# Patient Record
Sex: MALE | Race: WHITE | NOT HISPANIC OR LATINO | Employment: OTHER | URBAN - METROPOLITAN AREA
[De-identification: names, ages, dates, MRNs, and addresses within clinical notes are randomized per-mention and may not be internally consistent; named-entity substitution may affect disease eponyms.]

---

## 2023-09-18 LAB
PROSTATE SPECIFIC AG (NG/ML) IN SER/PLAS: 19.1 NG/ML (ref 0–4)
PROSTATE SPECIFIC AG FREE (NG/ML) IN SER/PLAS: 3.6 NG/ML
PROSTATE SPECIFIC AG FREE/PROSTATE SPECIFIC AG TOTAL IN SER/PLAS: 19 %

## 2023-10-10 DIAGNOSIS — K21.9 GASTROESOPHAGEAL REFLUX DISEASE, UNSPECIFIED WHETHER ESOPHAGITIS PRESENT: Primary | ICD-10-CM

## 2023-10-18 ENCOUNTER — LAB (OUTPATIENT)
Dept: LAB | Facility: LAB | Age: 58
End: 2023-10-18
Payer: COMMERCIAL

## 2023-10-18 DIAGNOSIS — R97.20 ELEVATED PROSTATE SPECIFIC ANTIGEN (PSA): Primary | ICD-10-CM

## 2023-10-18 PROCEDURE — 84154 ASSAY OF PSA FREE: CPT

## 2023-10-18 PROCEDURE — 36415 COLL VENOUS BLD VENIPUNCTURE: CPT

## 2023-10-18 PROCEDURE — 84153 ASSAY OF PSA TOTAL: CPT

## 2023-10-20 LAB
PSA FREE MFR SERPL: 17 %
PSA FREE SERPL-MCNC: 1.6 NG/ML
PSA SERPL IA-MCNC: 9.6 NG/ML (ref 0–4)

## 2023-10-25 RX ORDER — PANTOPRAZOLE SODIUM 40 MG/1
40 TABLET, DELAYED RELEASE ORAL
Qty: 90 TABLET | Refills: 0 | Status: SHIPPED | OUTPATIENT
Start: 2023-10-25

## 2024-03-21 ENCOUNTER — NUTRITION (OUTPATIENT)
Dept: NUTRITION | Facility: HOSPITAL | Age: 59
End: 2024-03-21
Payer: COMMERCIAL

## 2024-03-21 VITALS — BODY MASS INDEX: 30.16 KG/M2 | HEIGHT: 74 IN | WEIGHT: 235 LBS

## 2024-03-21 DIAGNOSIS — E11.69 TYPE 2 DIABETES MELLITUS WITH OTHER SPECIFIED COMPLICATION, WITHOUT LONG-TERM CURRENT USE OF INSULIN (MULTI): Primary | ICD-10-CM

## 2024-03-21 DIAGNOSIS — E11.65 TYPE 2 DIABETES MELLITUS WITH HYPERGLYCEMIA (MULTI): ICD-10-CM

## 2024-03-21 PROCEDURE — 97802 MEDICAL NUTRITION INDIV IN: CPT | Performed by: FAMILY MEDICINE

## 2024-03-21 NOTE — PROGRESS NOTES
"Nutrition Assessment     Reason for Visit:  Dayday Ross is a 58 y.o. male who presents for diabetes education/wellness.     Anthropometrics:  Anthropometrics  Height: 188 cm (6' 2\")  Weight: 107 kg (235 lb) (pt stated)  BMI (Calculated): 30.16  IBW/kg (Dietitian Calculated): 86.3 kg  Percent of IBW: 124 %         Food And Nutrient Intake:  Food and Nutrient History  Food and Nutrient History: Pt would like to eat better, behavioral health. Team had recommended pt to try \"core diet\" but pt felt this was too restrictive and he would not be able to maintian this meal plan. Pt has reduced pop now choosing diet pop. Pt eats more protein and will not eat a lot of pasta, rice. Pt feels his struggle is snacking at night time due to sleep apnea. Pt will usually wake up in the middle of the night and snack on foods like a granola bar.  Energy Intake: Good > 75 %  Fluid Intake: diet cranberry juice, couple bottles of water, diet iced tea  Food Allergy: none  Food Intolerance: none  GI Symptoms: none  Sleep Duration/Quality: 5-6 hrs disrupted (sleep apnea)     Food Intake  Meal 1: Breakfast: 2 packets of diet oatmeal, diet iced tea  Meal 2: Lunch: chicken mcnuggets, water  Meal 3: Dinner: microwave TV dinner, just eat the steak. Lean cuisinses  Snacks: granola bars, popcorns, mini bags of chips,    Food Preparation  Cooking:  (no cooking)  Grocery Shopping: Patient  Dining Out: 1 to 3 times a week  Other: Pt does not cook d/t not liking it. Chooses a lot of convenience foods.     Micronutrient Intake  Vitamin Intake: B12         Physical Activities:  Physical Activity  Bed/Chair Ridden: No  Physical Activity History: Walking. Pt has a lot of anxiety, more sedentary time  Consistency: Yes  Frequency (times/week): 7 (times/week)  Duration (minutes/day): 45 minutes/day         Nutrition Focused Physical Exam:  Subcutaneous Fat Loss  Orbital Fat Pads: Defer (no signs of malnutrition)        Energy Needs  Calculated Energy Needs " "Using Equations  Height: 188 cm (6' 2\")  Estimated Energy Needs  Total Energy Estimated Needs (kCal): 1900 kCal  Method for Estimating Needs: MSJ  Estimated Fluid Needs  Method for Estimating Needs: 64 ounces or 1ml/kcal  Estimated Carbohydrate Needs  Method for Estimating Needs: 75g CCD        Nutrition Diagnosis      Nutrition Diagnosis  Patient has Nutrition Diagnosis: Yes  Diagnosis Status (1): New  Nutrition Diagnosis 1: Obese  Related to (1): food and nutrition knowledge deficit  As Evidenced by (1): BMI 30.1, pt interview and diet recall, sedentary activities    Nutrition Interventions/Recommendations   Food and Nutrition Delivery  Meals & Snacks: Carbohydrate-modified diet, Energy-modified diet  Goals: 75g CCD, 1900kcals  Nutrition Education  Nutrition Education Content: Content related nutrition education (plate method)  Goals: Pt to comply with both verbal and written education provided in the one-one-one setting.  Nutrition Counseling  Strategies: Nutrition counseling based on goal setting strategy  Goals: 1) Eat 3 consistent meals per day with 1-2 snacks in between, or 4 small meals but keep it consistent 2) Make sure protein is included in every meal and snack 3) Increase water intake to 64 ounces 4) Limit added sugars to less than 38g per day 5) Use Plate method to build meals: 1/2 nonstarchy vegetables, 1/4 starch, 1/4 protein, aim for 75g of carbs per meal  6) Increase strength conditioning with at home workouts        Patient Instructions   1) Eat 3 consistent meals per day with 1-2 snacks in between, or 4 small meals but keep it consistent   2) Make sure protein is included in every meal and snack   3) Increase water intake to 64 ounces   4) Limit added sugars to less than 38g per day   5) Use Plate method to build meals: 1/2 nonstarchy vegetables, 1/4 starch, 1/4 protein, aim for 75g of carbs per meal   6) Increase strength conditioning with at home workouts     Nutrition Monitoring and Evaluation "   Food/Nutrient Related History Monitoring  Monitoring and Evaluation Plan: Energy intake, Fluid intake, Amount of food, Meal/snack pattern, Carbohydrate intake  Energy Intake: Estimated energy intake  Criteria: Intake vs Recommendations  Fluid Intake: Estimated fluid intake  Criteria: Intake vs Recommendations  Amount of Food: Estimated amout of food, Types of food  Criteria: Intake vs Recommendations  Meal/Snack Pattern: Estimated meal and snack pattern  Criteria: Intake vs Recommendations  Estimated carbohydrate intake: Estimated carbohydrate intake  Criteria: Intake vs Recommendations  Body Composition/Growth/Weight History  Monitoring and Evaluation Plan: Weight  Weight: Measured weight  Criteria: Recent wt vs future weights  Biochemical Data, Medical Tests and Procedures  Monitoring and Evaluation Plan: Glucose/endocrine profile  Glucose/Endocrine Profile: Glucose, casual, Glucose, fasting, Hemoglobin A1c (HgbA1c)  Criteria: Blood sugars to stay WNL, fasting 70-130mg/dL, 1-2 hours after eating <180mg/dL, A1C <7%          Time Spent  Time spent directly with patient, family or caregiver: 40 minutes        Readiness to Change : Good  Level of Understanding : Good  Anticipated Compliant : Good

## 2024-03-21 NOTE — PATIENT INSTRUCTIONS
1) Eat 3 consistent meals per day with 1-2 snacks in between, or 4 small meals but keep it consistent   2) Make sure protein is included in every meal and snack   3) Increase water intake to 64 ounces   4) Limit added sugars to less than 38g per day   5) Use Plate method to build meals: 1/2 nonstarchy vegetables, 1/4 starch, 1/4 protein, aim for 75g of carbs per meal   6) Increase strength conditioning with at home workouts

## 2024-03-21 NOTE — LETTER
"March 21, 2024     Yolette Escamilla MD  Office Address Unavailable  As Of 02/27/2024    Patient: Dayday Ross   YOB: 1965   Date of Visit: 3/21/2024       Dear Dr. Yolette Escamilla MD:    Thank you for referring Dayday Ross to me for evaluation. Below are my notes for this consultation.  If you have questions, please do not hesitate to call me. I look forward to following your patient along with you.       Sincerely,     Alice Horvath, THAD, LD      CC: No Recipients  ______________________________________________________________________________________    Nutrition Assessment    Reason for Visit:  Dayday Ross is a 58 y.o. male who presents for diabetes education/wellness.     Anthropometrics:  Anthropometrics  Height: 188 cm (6' 2\")  Weight: 107 kg (235 lb) (pt stated)  BMI (Calculated): 30.16  IBW/kg (Dietitian Calculated): 86.3 kg  Percent of IBW: 124 %         Food And Nutrient Intake:  Food and Nutrient History  Food and Nutrient History: Pt would like to eat better, behavioral health. Team had recommended pt to try \"core diet\" but pt felt this was too restrictive and he would not be able to maintian this meal plan. Pt has reduced pop now choosing diet pop. Pt eats more protein and will not eat a lot of pasta, rice. Pt feels his struggle is snacking at night time due to sleep apnea. Pt will usually wake up in the middle of the night and snack on foods like a granola bar.  Energy Intake: Good > 75 %  Fluid Intake: diet cranberry juice, couple bottles of water, diet iced tea  Food Allergy: none  Food Intolerance: none  GI Symptoms: none  Sleep Duration/Quality: 5-6 hrs disrupted (sleep apnea)     Food Intake  Meal 1: Breakfast: 2 packets of diet oatmeal, diet iced tea  Meal 2: Lunch: chicken mcnuggets, water  Meal 3: Dinner: microwave TV dinner, just eat the steak. Lean cuisinses  Snacks: granola bars, popcorns, mini bags of chips,    Food Preparation  Cooking:  (no cooking)  Grocery Shopping: " "Patient  Dining Out: 1 to 3 times a week  Other: Pt does not cook d/t not liking it. Chooses a lot of convenience foods.     Micronutrient Intake  Vitamin Intake: B12         Physical Activities:  Physical Activity  Bed/Chair Ridden: No  Physical Activity History: Walking. Pt has a lot of anxiety, more sedentary time  Consistency: Yes  Frequency (times/week): 7 (times/week)  Duration (minutes/day): 45 minutes/day         Nutrition Focused Physical Exam:  Subcutaneous Fat Loss  Orbital Fat Pads: Defer (no signs of malnutrition)        Energy Needs  Calculated Energy Needs Using Equations  Height: 188 cm (6' 2\")  Estimated Energy Needs  Total Energy Estimated Needs (kCal): 1900 kCal  Method for Estimating Needs: MSJ  Estimated Fluid Needs  Method for Estimating Needs: 64 ounces or 1ml/kcal  Estimated Carbohydrate Needs  Method for Estimating Needs: 75g CCD        Nutrition Diagnosis     Nutrition Diagnosis  Patient has Nutrition Diagnosis: Yes  Diagnosis Status (1): New  Nutrition Diagnosis 1: Obese  Related to (1): food and nutrition knowledge deficit  As Evidenced by (1): BMI 30.1, pt interview and diet recall, sedentary activities    Nutrition Interventions/Recommendations  Food and Nutrition Delivery  Meals & Snacks: Carbohydrate-modified diet, Energy-modified diet  Goals: 75g CCD, 1900kcals  Nutrition Education  Nutrition Education Content: Content related nutrition education (plate method)  Goals: Pt to comply with both verbal and written education provided in the one-one-one setting.  Nutrition Counseling  Strategies: Nutrition counseling based on goal setting strategy  Goals: 1) Eat 3 consistent meals per day with 1-2 snacks in between, or 4 small meals but keep it consistent 2) Make sure protein is included in every meal and snack 3) Increase water intake to 64 ounces 4) Limit added sugars to less than 38g per day 5) Use Plate method to build meals: 1/2 nonstarchy vegetables, 1/4 starch, 1/4 protein, aim for " 75g of carbs per meal  6) Increase strength conditioning with at home workouts        Patient Instructions   1) Eat 3 consistent meals per day with 1-2 snacks in between, or 4 small meals but keep it consistent   2) Make sure protein is included in every meal and snack   3) Increase water intake to 64 ounces   4) Limit added sugars to less than 38g per day   5) Use Plate method to build meals: 1/2 nonstarchy vegetables, 1/4 starch, 1/4 protein, aim for 75g of carbs per meal   6) Increase strength conditioning with at home workouts     Nutrition Monitoring and Evaluation  Food/Nutrient Related History Monitoring  Monitoring and Evaluation Plan: Energy intake, Fluid intake, Amount of food, Meal/snack pattern, Carbohydrate intake  Energy Intake: Estimated energy intake  Criteria: Intake vs Recommendations  Fluid Intake: Estimated fluid intake  Criteria: Intake vs Recommendations  Amount of Food: Estimated amout of food, Types of food  Criteria: Intake vs Recommendations  Meal/Snack Pattern: Estimated meal and snack pattern  Criteria: Intake vs Recommendations  Estimated carbohydrate intake: Estimated carbohydrate intake  Criteria: Intake vs Recommendations  Body Composition/Growth/Weight History  Monitoring and Evaluation Plan: Weight  Weight: Measured weight  Criteria: Recent wt vs future weights  Biochemical Data, Medical Tests and Procedures  Monitoring and Evaluation Plan: Glucose/endocrine profile  Glucose/Endocrine Profile: Glucose, casual, Glucose, fasting, Hemoglobin A1c (HgbA1c)  Criteria: Blood sugars to stay WNL, fasting 70-130mg/dL, 1-2 hours after eating <180mg/dL, A1C <7%          Time Spent  Time spent directly with patient, family or caregiver: 40 minutes        Readiness to Change : Good  Level of Understanding : Good  Anticipated Compliant : Good

## 2024-04-22 ENCOUNTER — HOSPITAL ENCOUNTER (OUTPATIENT)
Dept: RADIOLOGY | Facility: HOSPITAL | Age: 59
Discharge: HOME | End: 2024-04-22
Payer: COMMERCIAL

## 2024-04-22 DIAGNOSIS — R05.3 CHRONIC COUGH: ICD-10-CM

## 2024-04-23 ENCOUNTER — APPOINTMENT (OUTPATIENT)
Dept: NEUROLOGY | Facility: CLINIC | Age: 59
End: 2024-04-23
Payer: COMMERCIAL

## 2024-04-24 ENCOUNTER — APPOINTMENT (OUTPATIENT)
Dept: NUTRITION | Facility: HOSPITAL | Age: 59
End: 2024-04-24
Payer: COMMERCIAL

## 2024-05-06 ENCOUNTER — APPOINTMENT (OUTPATIENT)
Dept: RADIOLOGY | Facility: HOSPITAL | Age: 59
End: 2024-05-06
Payer: COMMERCIAL

## 2024-05-15 ENCOUNTER — LAB (OUTPATIENT)
Dept: LAB | Facility: LAB | Age: 59
End: 2024-05-15
Payer: COMMERCIAL

## 2024-05-15 ENCOUNTER — HOSPITAL ENCOUNTER (OUTPATIENT)
Dept: RADIOLOGY | Facility: HOSPITAL | Age: 59
Discharge: HOME | End: 2024-05-15
Payer: COMMERCIAL

## 2024-05-15 DIAGNOSIS — R05.3 CHRONIC COUGH: ICD-10-CM

## 2024-05-15 DIAGNOSIS — E78.5 HYPERLIPIDEMIA, UNSPECIFIED: ICD-10-CM

## 2024-05-15 DIAGNOSIS — E11.9 TYPE 2 DIABETES MELLITUS WITHOUT COMPLICATIONS (MULTI): Primary | ICD-10-CM

## 2024-05-15 LAB
ALBUMIN SERPL BCP-MCNC: 4.5 G/DL (ref 3.4–5)
ALP SERPL-CCNC: 45 U/L (ref 33–136)
ALT SERPL W P-5'-P-CCNC: 23 U/L (ref 10–52)
ANION GAP SERPL CALC-SCNC: 14 MMOL/L (ref 10–20)
AST SERPL W P-5'-P-CCNC: 21 U/L (ref 9–39)
BASOPHILS # BLD AUTO: 0.03 X10*3/UL (ref 0–0.1)
BASOPHILS NFR BLD AUTO: 0.5 %
BILIRUB SERPL-MCNC: 0.8 MG/DL (ref 0–1.2)
BUN SERPL-MCNC: 14 MG/DL (ref 6–23)
CALCIUM SERPL-MCNC: 9.9 MG/DL (ref 8.6–10.3)
CHLORIDE SERPL-SCNC: 106 MMOL/L (ref 98–107)
CO2 SERPL-SCNC: 23 MMOL/L (ref 21–32)
CREAT SERPL-MCNC: 1.3 MG/DL (ref 0.5–1.3)
EGFRCR SERPLBLD CKD-EPI 2021: 63 ML/MIN/1.73M*2
EOSINOPHIL # BLD AUTO: 0.24 X10*3/UL (ref 0–0.7)
EOSINOPHIL NFR BLD AUTO: 3.7 %
ERYTHROCYTE [DISTWIDTH] IN BLOOD BY AUTOMATED COUNT: 12.3 % (ref 11.5–14.5)
GLUCOSE SERPL-MCNC: 188 MG/DL (ref 74–99)
HCT VFR BLD AUTO: 44.1 % (ref 41–52)
HGB BLD-MCNC: 15.6 G/DL (ref 13.5–17.5)
IMM GRANULOCYTES # BLD AUTO: 0.02 X10*3/UL (ref 0–0.7)
IMM GRANULOCYTES NFR BLD AUTO: 0.3 % (ref 0–0.9)
LYMPHOCYTES # BLD AUTO: 1.77 X10*3/UL (ref 1.2–4.8)
LYMPHOCYTES NFR BLD AUTO: 27.5 %
MCH RBC QN AUTO: 30.2 PG (ref 26–34)
MCHC RBC AUTO-ENTMCNC: 35.4 G/DL (ref 32–36)
MCV RBC AUTO: 85 FL (ref 80–100)
MONOCYTES # BLD AUTO: 0.47 X10*3/UL (ref 0.1–1)
MONOCYTES NFR BLD AUTO: 7.3 %
NEUTROPHILS # BLD AUTO: 3.9 X10*3/UL (ref 1.2–7.7)
NEUTROPHILS NFR BLD AUTO: 60.7 %
NRBC BLD-RTO: 0 /100 WBCS (ref 0–0)
PLATELET # BLD AUTO: 235 X10*3/UL (ref 150–450)
POTASSIUM SERPL-SCNC: 4.6 MMOL/L (ref 3.5–5.3)
PROT SERPL-MCNC: 6.8 G/DL (ref 6.4–8.2)
RBC # BLD AUTO: 5.17 X10*6/UL (ref 4.5–5.9)
SODIUM SERPL-SCNC: 138 MMOL/L (ref 136–145)
WBC # BLD AUTO: 6.4 X10*3/UL (ref 4.4–11.3)

## 2024-05-15 PROCEDURE — 36415 COLL VENOUS BLD VENIPUNCTURE: CPT

## 2024-05-15 PROCEDURE — 71046 X-RAY EXAM CHEST 2 VIEWS: CPT | Performed by: STUDENT IN AN ORGANIZED HEALTH CARE EDUCATION/TRAINING PROGRAM

## 2024-05-15 PROCEDURE — 85025 COMPLETE CBC W/AUTO DIFF WBC: CPT

## 2024-05-15 PROCEDURE — 80053 COMPREHEN METABOLIC PANEL: CPT

## 2024-05-15 PROCEDURE — 71046 X-RAY EXAM CHEST 2 VIEWS: CPT

## 2024-05-15 PROCEDURE — 83036 HEMOGLOBIN GLYCOSYLATED A1C: CPT

## 2024-05-16 LAB
EST. AVERAGE GLUCOSE BLD GHB EST-MCNC: 148 MG/DL
HBA1C MFR BLD: 6.8 %

## 2024-08-13 ENCOUNTER — LAB (OUTPATIENT)
Dept: LAB | Facility: LAB | Age: 59
End: 2024-08-13
Payer: COMMERCIAL

## 2024-08-13 DIAGNOSIS — R97.20 ELEVATED PROSTATE SPECIFIC ANTIGEN (PSA): Primary | ICD-10-CM

## 2024-08-13 LAB
APPEARANCE UR: CLEAR
BILIRUB UR STRIP.AUTO-MCNC: NEGATIVE MG/DL
COLOR UR: NORMAL
GLUCOSE UR STRIP.AUTO-MCNC: NORMAL MG/DL
KETONES UR STRIP.AUTO-MCNC: NEGATIVE MG/DL
LEUKOCYTE ESTERASE UR QL STRIP.AUTO: NEGATIVE
NITRITE UR QL STRIP.AUTO: NEGATIVE
PH UR STRIP.AUTO: 5 [PH]
PROT UR STRIP.AUTO-MCNC: NEGATIVE MG/DL
PSA SERPL-MCNC: 5.84 NG/ML
RBC # UR STRIP.AUTO: NEGATIVE /UL
SP GR UR STRIP.AUTO: 1.02
UROBILINOGEN UR STRIP.AUTO-MCNC: NORMAL MG/DL

## 2024-08-13 PROCEDURE — 36415 COLL VENOUS BLD VENIPUNCTURE: CPT

## 2024-08-13 PROCEDURE — 84153 ASSAY OF PSA TOTAL: CPT

## 2024-08-13 PROCEDURE — 81003 URINALYSIS AUTO W/O SCOPE: CPT

## 2024-08-18 NOTE — PROGRESS NOTES
Subjective   Patient ID: Dayday Ross is a 58 y.o. male who presents for No chief complaint on file..    HPI  PT PRESENTS FOR A F/U ON HIS H/O AN ELEVATED PSA.  PT S/P TRUSP BX -- NEG FOR CANCER.  THE PSA CAME BACK DOWN TO NORMAL .   Are you experiencing:  Burning on urination -- NO  Pain on urination  -- NO  Urinary frequency -- NO  Urinary urgency --  NO  Urge incontinence -- NO  Nocturia-- 2 -3 X ON AVG  Hematuria -- NO  Hesitancy -- NO  Post void fullness --  NO    Review of Systems  General-- No C/O fever or chills  Head-- No C/O Dizziness  Eyes-- NO  C/O blurry or double vision  Ears-- No C/O hearing loss  Neck-- Supple  Chest-- No C/O pain or discomfort  Lungs-- No C/O shortness of breath  Abdomen-- No C/O  pain or discomfort, No nausea or vomiting  Back-- OCC C/O STIFFNESS AND  discomfort  WHEN HE STANDS UP  Extremities-- No C/O swelling or pain    Objective   Physical Exam    General-- well developed, well nourished in NAD  Head-- normal cephalic, atraumatic  Eyes-- PERRL, EOM'S FROM,  no  jaundice  Neck-- Supple, without masses  Chest-- Normal bony structure  Abdomen-- soft, non tender, liver spleen not palpable . No supra pubic masses  Back-- no flank masses palpable, no CVA tenderness on palpation or perc;ussion  Lymph nodes-- No inguinal lymphadenopathy noted  Prostate-- 2+, firm, smooth, non tender,without nodules  Testis-- both down, non tender, without masses  Epididymis-- no masses palpable  Scrotum -- no hydrocele noted  Extremities -- Normal muscle mass and tone for the patients age  Neurological-- oriented times three    U/A DIPSTICK-- WNL     PSA  8-13-24-- 5.84  10-18-23-- 9.6-- FREE PSA-- 17%  9-15-23 -- 19.1 -- FREE PSA -- 19 %    Assessment/Plan   P:  H/O AN ELEVATED PSA WITH A NORMAL FEELING PROSTATE  NO  FAMILY H/O PROSTATE CANCER  PT S/P TRUSP BX - NEG FOR CANCER  P:  OBTAIN ALL OLD RECORDS FROM [facility]   CONSERVATIVE THERAPY FOR NOW  F/U IN 6 MONTHS FOR A PALMA AND PSA CK    Piero  MILI Ramos MD 08/18/24 4:03 PM

## 2024-08-21 ENCOUNTER — OFFICE VISIT (OUTPATIENT)
Dept: UROLOGY | Facility: CLINIC | Age: 59
End: 2024-08-21
Payer: COMMERCIAL

## 2024-08-21 VITALS
HEART RATE: 96 BPM | TEMPERATURE: 97.7 F | RESPIRATION RATE: 16 BRPM | SYSTOLIC BLOOD PRESSURE: 147 MMHG | BODY MASS INDEX: 29.52 KG/M2 | DIASTOLIC BLOOD PRESSURE: 89 MMHG | WEIGHT: 230 LBS | HEIGHT: 74 IN

## 2024-08-21 DIAGNOSIS — R97.20 ELEVATED PSA: ICD-10-CM

## 2024-08-21 DIAGNOSIS — R35.1 NOCTURIA: Primary | ICD-10-CM

## 2024-08-21 DIAGNOSIS — R39.89 OTHER SYMPTOMS AND SIGNS INVOLVING THE GENITOURINARY SYSTEM: ICD-10-CM

## 2024-08-21 PROCEDURE — 99214 OFFICE O/P EST MOD 30 MIN: CPT | Performed by: UROLOGY

## 2024-08-21 PROCEDURE — 3008F BODY MASS INDEX DOCD: CPT | Performed by: UROLOGY

## 2024-08-21 PROCEDURE — 1036F TOBACCO NON-USER: CPT | Performed by: UROLOGY

## 2024-08-21 RX ORDER — BUSPIRONE HYDROCHLORIDE 15 MG/1
15 TABLET ORAL 3 TIMES DAILY
COMMUNITY
Start: 2023-11-28

## 2024-08-21 RX ORDER — LIRAGLUTIDE 6 MG/ML
INJECTION SUBCUTANEOUS
COMMUNITY

## 2024-08-21 RX ORDER — SITAGLIPTIN 50 MG/1
50 TABLET, FILM COATED ORAL DAILY
COMMUNITY

## 2024-08-21 RX ORDER — FLUVOXAMINE MALEATE 100 MG/1
TABLET, COATED ORAL
COMMUNITY
Start: 2023-03-01

## 2024-08-21 RX ORDER — LANOLIN ALCOHOL/MO/W.PET/CERES
1000 CREAM (GRAM) TOPICAL DAILY
COMMUNITY

## 2024-08-21 RX ORDER — PEN NEEDLE, DIABETIC 30 GX3/16"
NEEDLE, DISPOSABLE MISCELLANEOUS
COMMUNITY
Start: 2024-07-12

## 2024-08-21 RX ORDER — DARIDOREXANT 50 MG/1
1 TABLET, FILM COATED ORAL NIGHTLY
COMMUNITY
Start: 2024-01-29

## 2024-08-21 RX ORDER — ATENOLOL 25 MG/1
TABLET ORAL
COMMUNITY

## 2024-08-21 RX ORDER — BREXPIPRAZOLE 1 MG/1
1 TABLET ORAL
COMMUNITY
Start: 2024-08-17

## 2024-08-21 RX ORDER — HYDROXYZINE HYDROCHLORIDE 50 MG/1
TABLET, FILM COATED ORAL
COMMUNITY
Start: 2023-04-22

## 2024-08-21 SDOH — ECONOMIC STABILITY: FOOD INSECURITY: WITHIN THE PAST 12 MONTHS, YOU WORRIED THAT YOUR FOOD WOULD RUN OUT BEFORE YOU GOT MONEY TO BUY MORE.: NEVER TRUE

## 2024-08-21 SDOH — ECONOMIC STABILITY: FOOD INSECURITY: WITHIN THE PAST 12 MONTHS, THE FOOD YOU BOUGHT JUST DIDN'T LAST AND YOU DIDN'T HAVE MONEY TO GET MORE.: NEVER TRUE

## 2024-08-21 ASSESSMENT — PATIENT HEALTH QUESTIONNAIRE - PHQ9
2. FEELING DOWN, DEPRESSED OR HOPELESS: NOT AT ALL
SUM OF ALL RESPONSES TO PHQ9 QUESTIONS 1 AND 2: 0
1. LITTLE INTEREST OR PLEASURE IN DOING THINGS: NOT AT ALL

## 2024-08-21 ASSESSMENT — LIFESTYLE VARIABLES
HOW OFTEN DO YOU HAVE A DRINK CONTAINING ALCOHOL: NEVER
HOW MANY STANDARD DRINKS CONTAINING ALCOHOL DO YOU HAVE ON A TYPICAL DAY: PATIENT DOES NOT DRINK
HOW OFTEN DO YOU HAVE SIX OR MORE DRINKS ON ONE OCCASION: NEVER
SKIP TO QUESTIONS 9-10: 1
AUDIT-C TOTAL SCORE: 0

## 2024-08-21 ASSESSMENT — COLUMBIA-SUICIDE SEVERITY RATING SCALE - C-SSRS
1. IN THE PAST MONTH, HAVE YOU WISHED YOU WERE DEAD OR WISHED YOU COULD GO TO SLEEP AND NOT WAKE UP?: NO
2. HAVE YOU ACTUALLY HAD ANY THOUGHTS OF KILLING YOURSELF?: NO
6. HAVE YOU EVER DONE ANYTHING, STARTED TO DO ANYTHING, OR PREPARED TO DO ANYTHING TO END YOUR LIFE?: NO

## 2024-08-21 ASSESSMENT — ENCOUNTER SYMPTOMS
LOSS OF SENSATION IN FEET: 0
OCCASIONAL FEELINGS OF UNSTEADINESS: 0
DEPRESSION: 0

## 2024-08-21 ASSESSMENT — PAIN SCALES - GENERAL: PAINLEVEL: 0-NO PAIN

## 2024-08-21 NOTE — LETTER
August 23, 2024     Yolette Escamilla MD  [address]  [facility]  [facility] [address]    Patient: Dayday Ross   YOB: 1965   Date of Visit: 8/21/2024       Dear Dr. Yolette Escamilla MD:    Thank you for referring Dayday Ross to me for evaluation. Below are my notes for this consultation.  If you have questions, please do not hesitate to call me. I look forward to following your patient along with you.       Sincerely,     Piero Ramos MD      CC: No Recipients  ______________________________________________________________________________________    Subjective  Patient ID: Dayday Ross is a 58 y.o. male who presents for No chief complaint on file..    HPI  PT PRESENTS FOR A F/U ON HIS H/O AN ELEVATED PSA.  PT S/P TRUSP BX -- NEG FOR CANCER.  THE PSA CAME BACK DOWN TO NORMAL .   Are you experiencing:  Burning on urination -- NO  Pain on urination  -- NO  Urinary frequency -- NO  Urinary urgency --  NO  Urge incontinence -- NO  Nocturia-- 2 -3 X ON AVG  Hematuria -- NO  Hesitancy -- NO  Post void fullness --  NO    Review of Systems  General-- No C/O fever or chills  Head-- No C/O Dizziness  Eyes-- NO  C/O blurry or double vision  Ears-- No C/O hearing loss  Neck-- Supple  Chest-- No C/O pain or discomfort  Lungs-- No C/O shortness of breath  Abdomen-- No C/O  pain or discomfort, No nausea or vomiting  Back-- OCC C/O STIFFNESS AND  discomfort  WHEN HE STANDS UP  Extremities-- No C/O swelling or pain    Objective   Physical Exam    General-- well developed, well nourished in NAD  Head-- normal cephalic, atraumatic  Eyes-- PERRL, EOM'S FROM,  no  jaundice  Neck-- Supple, without masses  Chest-- Normal bony structure  Abdomen-- soft, non tender, liver spleen not palpable . No supra pubic masses  Back-- no flank masses palpable, no CVA tenderness on palpation or perc;ussion  Lymph nodes-- No inguinal lymphadenopathy noted  Prostate-- 2+, firm, smooth, non tender,without  nodules  Testis-- both down, non tender, without masses  Epididymis-- no masses palpable  Scrotum -- no hydrocele noted  Extremities -- Normal muscle mass and tone for the patients age  Neurological-- oriented times three    U/A DIPSTICK-- WNL     PSA  8-13-24-- 5.84  10-18-23-- 9.6-- FREE PSA-- 17%  9-15-23 -- 19.1 -- FREE PSA -- 19 %    Assessment/Plan   P:  H/O AN ELEVATED PSA WITH A NORMAL FEELING PROSTATE  NO  FAMILY H/O PROSTATE CANCER  PT S/P TRUSP BX - NEG FOR CANCER  P:  OBTAIN ALL OLD RECORDS FROM [facility]   CONSERVATIVE THERAPY FOR NOW  F/U IN 6 MONTHS FOR A PALMA AND PSA CK    Piero Ramos MD 08/18/24 4:03 PM

## 2024-09-16 NOTE — PROGRESS NOTES
Subjective     History of Present Illness:   Dayday Ross is a 58 y.o. male who presents to GI clinic for follow-up.  Seen in the past for internal hemorrhoids treated conservatively.  .  He had a colonoscopy 4 years ago and was told he has hemorrhoids.  He comes back because he continues to have symptoms particularly with intermittent bleeding  He said that he will occasionally have bleeding and pain with the bowel movements and he feels the hemorrhoids are acting up.  He is not constipated and having regular bowel movements.      Review of Systems  Review of Systems   Constitutional: Negative.    HENT: Negative.     Eyes: Negative.    Respiratory: Negative.     Cardiovascular: Negative.    Gastrointestinal:         As in HPI    Endocrine: Negative.    Genitourinary: Negative.    Musculoskeletal: Negative.    Skin: Negative.    Neurological: Negative.    Psychiatric/Behavioral: Negative.         Past Medical History   has a past medical history of Other specified disorders of nose and nasal sinuses (10/19/2022), Other specified disorders of nose and nasal sinuses (10/19/2022), and Personal history of other specified conditions (10/19/2022).     Social History   reports that he has never smoked. He has never used smokeless tobacco. He reports that he does not drink alcohol and does not use drugs.     Family History  family history includes Heart disease in his father.     Allergies  Allergies   Allergen Reactions    Penicillins Swelling and Hives       Medications  Current Outpatient Medications   Medication Instructions    atenolol (Tenormin) 25 mg tablet oral    busPIRone (BUSPAR) 15 mg, oral, 3 times daily    cyanocobalamin (VITAMIN B-12) 1,000 mcg, oral, Daily    fLuvoxaMINE (Luvox) 100 mg tablet oral    hydrOXYzine HCL (Atarax) 50 mg tablet TAKE 1 TO 2 TABLETS BY MOUTH UP TO TWICE DAILY AS NEEDED FOR ANXIETY OR SLEEP    Januvia 50 mg, oral, Daily    pantoprazole (PROTONIX) 40 mg, oral, Daily before breakfast,  "take 30 minutes before breakfast    pen needle, diabetic 32 gauge x 5/32\" needle USE AS DIRECTED WITH VICTOZA    Quviviq 50 mg tablet 1 tablet, oral, Nightly    Rexulti 1 mg tablet 1 tablet, oral, Daily (0630)    rosuvastatin 10 mg, oral, Daily    Victoza 3-Keanu 0.6 mg/0.1 mL (18 mg/3 mL) injection INJECT 1.2 MG SUBCUTANEOUSLY ONCE DAILY       Objective   There were no vitals taken for this visit.  Physical Exam  Vitals reviewed.   Constitutional:       Appearance: Normal appearance.   HENT:      Head: Normocephalic.   Eyes:      Conjunctiva/sclera: Conjunctivae normal.      Pupils: Pupils are equal, round, and reactive to light.   Cardiovascular:      Rate and Rhythm: Normal rate and regular rhythm.   Pulmonary:      Effort: Pulmonary effort is normal.      Breath sounds: Normal breath sounds.   Abdominal:      General: Abdomen is flat. Bowel sounds are normal. There is no distension.      Palpations: Abdomen is soft.      Tenderness: There is no abdominal tenderness. There is no guarding or rebound.   Musculoskeletal:         General: Normal range of motion.   Skin:     General: Skin is warm and dry.   Neurological:      General: No focal deficit present.      Mental Status: He is alert and oriented to person, place, and time.                 Assessment/Plan   Dayday Ross is a 58 y.o. male who presents to GI clinic for evaluation of rectal bleeding and rectal pain.  This is likely related to hemorrhoids.  At this point I told him we will need to do a colonoscopy to further evaluate the hemorrhoids and whether something else causing problems.  I will refer him to our surgical colleagues for consideration of banding of the hemorrhoids because he wants a more long-term solution.  .  He wants to get a colonoscopy scheduled in January I told him that is fine.  Will use Suprep.            Alexis Mccullough MD         "

## 2024-09-17 ENCOUNTER — APPOINTMENT (OUTPATIENT)
Dept: GASTROENTEROLOGY | Facility: CLINIC | Age: 59
End: 2024-09-17
Payer: COMMERCIAL

## 2024-09-17 VITALS
HEIGHT: 74 IN | BODY MASS INDEX: 30.31 KG/M2 | SYSTOLIC BLOOD PRESSURE: 161 MMHG | HEART RATE: 89 BPM | DIASTOLIC BLOOD PRESSURE: 91 MMHG | WEIGHT: 236.2 LBS

## 2024-09-17 DIAGNOSIS — K64.8 OTHER HEMORRHOIDS: ICD-10-CM

## 2024-09-17 DIAGNOSIS — K62.5 RECTAL BLEEDING: Primary | ICD-10-CM

## 2024-09-17 PROCEDURE — 3008F BODY MASS INDEX DOCD: CPT | Performed by: INTERNAL MEDICINE

## 2024-09-17 PROCEDURE — 99214 OFFICE O/P EST MOD 30 MIN: CPT | Performed by: INTERNAL MEDICINE

## 2024-09-17 PROCEDURE — 1036F TOBACCO NON-USER: CPT | Performed by: INTERNAL MEDICINE

## 2024-09-17 RX ORDER — SODIUM, POTASSIUM,MAG SULFATES 17.5-3.13G
1 SOLUTION, RECONSTITUTED, ORAL ORAL 2 TIMES DAILY
Qty: 2 EACH | Refills: 0 | Status: SHIPPED | OUTPATIENT
Start: 2024-09-17

## 2024-09-17 ASSESSMENT — ENCOUNTER SYMPTOMS
EYES NEGATIVE: 1
ENDOCRINE NEGATIVE: 1
NEUROLOGICAL NEGATIVE: 1
PSYCHIATRIC NEGATIVE: 1
CARDIOVASCULAR NEGATIVE: 1
ROS GI COMMENTS: AS IN HPI
RESPIRATORY NEGATIVE: 1
MUSCULOSKELETAL NEGATIVE: 1
CONSTITUTIONAL NEGATIVE: 1

## 2024-09-24 ENCOUNTER — APPOINTMENT (OUTPATIENT)
Dept: SURGERY | Facility: CLINIC | Age: 59
End: 2024-09-24
Payer: COMMERCIAL

## 2024-09-24 VITALS
SYSTOLIC BLOOD PRESSURE: 136 MMHG | HEART RATE: 96 BPM | OXYGEN SATURATION: 96 % | TEMPERATURE: 97.6 F | RESPIRATION RATE: 16 BRPM | HEIGHT: 74 IN | BODY MASS INDEX: 30.47 KG/M2 | WEIGHT: 237.4 LBS | DIASTOLIC BLOOD PRESSURE: 89 MMHG

## 2024-09-24 DIAGNOSIS — K64.8 BLEEDING INTERNAL HEMORRHOIDS: Primary | ICD-10-CM

## 2024-09-24 PROCEDURE — 46600 DIAGNOSTIC ANOSCOPY SPX: CPT | Performed by: SURGERY

## 2024-09-24 PROCEDURE — 1036F TOBACCO NON-USER: CPT | Performed by: SURGERY

## 2024-09-24 PROCEDURE — 99203 OFFICE O/P NEW LOW 30 MIN: CPT | Performed by: SURGERY

## 2024-09-24 PROCEDURE — 3008F BODY MASS INDEX DOCD: CPT | Performed by: SURGERY

## 2024-09-24 RX ORDER — HYDROCORTISONE ACETATE 25 MG/1
25 SUPPOSITORY RECTAL 2 TIMES DAILY
Qty: 20 SUPPOSITORY | Refills: 1 | Status: SHIPPED | OUTPATIENT
Start: 2024-09-24 | End: 2024-10-04

## 2024-09-24 NOTE — PROGRESS NOTES
History Of Present Illness  HPI 59-year-old male who complains of rectal pressure with intermittent Gly-Enzo for the paper.  He is known to have hemorrhoidal disease over the years.  However he is experiencing increasing discomfort now with blood on toilet paper not responding to over-the-counter antihemorrhoidal preparations.  He had a colonoscopy with polypectomy approximately 3 years ago.  Is scheduled for an upcoming colonoscopy.  There is no family history of inflammatory bowel disease or colon cancer.  No history of irritable bowel syndrome.  This history is otherwise reviewed and listed in chart.  He does take a fiber supplement in the morning.     Past Medical History  He has a past medical history of Other specified disorders of nose and nasal sinuses (10/19/2022), Other specified disorders of nose and nasal sinuses (10/19/2022), and Personal history of other specified conditions (10/19/2022).    Surgical History  He has no past surgical history on file.     Social History  He reports that he has never smoked. He has never used smokeless tobacco. He reports that he does not drink alcohol and does not use drugs.    Family History  Family History   Problem Relation Name Age of Onset    Heart disease Father          Allergies  Penicillins    Review of Systems 10 review of systems otherwise negative     Visit Vitals  /89   Pulse 96   Temp 36.4 °C (97.6 °F) (Temporal)   Resp 16        Physical Exam GENERAL  MENTAL STATUS - Alert. GENERAL APPEARANCE - Cooperative and well groomed. ORIENTATION - Oriented X4. BUILD & NUTRITION - Well nourished and well developed. HYDRATION - Well hydrated.    INTEGUMENTARY  GENERAL CHARACTERISTICS - Overall examination of the patient's skin reveals no rashes. COLOR - not icteric. SKIN MOISTURE - normal skin moisture. TEMPERATURE - normal worth is noted.    HEAD AND NECK  HEAD  HEAD SHAPE - Atraumatic and normocephalic.  TRACHEA - midline.  THYROID  GLAND CHARACTERISTICS -  "normal size and consistency and no palpable nodules.    EYE  SCLERA/CONJUNCTIVA - BILATERAL - Anicteric.    CHEST AND LUNG EXAM  AUSCULTATION -  BREATH SOUNDS - Clear.    BREAST - Did not examine.    CARDIOVASCULAR  AUSCULTATION: RHYTHM - Regular. HEART SOUNDS - Normal heart sounds.  MURMURS & OTHER HEART SOUNDS - Auscultation of the heart reveals regular rate and no murmurs.    ABDOMEN  PALPATION/PERCUSSION - Palpation and percussion of the abdomen reveal soft, non tender, no mass and no hepatosplenomegaly.  Skin tags noted.  No fissure no fistula.  No evidence of abscess.  Anoscopy reveals circumferential grade 2 circumferential internal hemorrhoids no mass.  No palpable abscess.  Sphincter tone  LYMPHATIC  GENERAL LYMPHATICS  BREAST LYMPHATIC EXAM - No cervical adenopathy, supraclavicular adenopathy or axilliary adenopathy.         Last Recorded Vitals  Blood pressure 136/89, pulse 96, temperature 36.4 °C (97.6 °F), temperature source Temporal, resp. rate 16, height 1.88 m (6' 2\"), weight 108 kg (237 lb 6.4 oz), SpO2 96%.    Relevant Results      No results found.      @[email or web address]@    Assessment/Plan       Anoscopy reveals circumferential grade 2 given circumferential nature of the findings I have recommended conservative management prior to rubber band ligation if this fails.       I spent 29 minutes in the professional and overall care of this patient.      Roney Mariscal MD    "

## 2024-09-24 NOTE — PATIENT INSTRUCTIONS
Thank you choosing [facility].  Today's exam reveals significant internal hemorrhoids.  I have recommended and the hemorrhoidal suppositories with cortisone to initially treat the area.  Please use the cortisone containing suppositories morning for 10-day course.  Increase fluids.  Avoid straining.  Increase your fiber supplement to twice daily.  Follow-up in the office in 2 weeks.  If the prescription is financially unaffordable, using over-the-counter antihemorrhoidal suppository in parallel fashion.  Contact the office for any questions or concerns

## 2024-09-27 ENCOUNTER — TELEPHONE (OUTPATIENT)
Dept: SURGERY | Facility: CLINIC | Age: 59
End: 2024-09-27
Payer: COMMERCIAL

## 2024-09-27 NOTE — TELEPHONE ENCOUNTER
Spoke with patient per Dr. Mariscal pt can buy the suppositiories over the counter. Patient stated he will go to the store to buy them and will see Dr. Mariscal 10/08/2024

## 2024-09-27 NOTE — TELEPHONE ENCOUNTER
Pt called stating his pharmacy did not have prescription. I contacted [facility] and they stated insurance wont cover medication.

## 2024-10-01 ENCOUNTER — TELEPHONE (OUTPATIENT)
Dept: SURGERY | Facility: CLINIC | Age: 59
End: 2024-10-01
Payer: COMMERCIAL

## 2024-10-01 NOTE — TELEPHONE ENCOUNTER
Patient called stating that suppositories he was prescribed are giving him really bad diarrhea, and it's impossible for him to leave the house. He needs to know what to do about it. Please advise. Thank you.

## 2024-10-01 NOTE — TELEPHONE ENCOUNTER
Per dr. Mariscal pt is to stop suppositories but diarrhea might not be from medication. Patient states its been going on for 3-4days.

## 2024-10-08 ENCOUNTER — APPOINTMENT (OUTPATIENT)
Dept: SURGERY | Facility: CLINIC | Age: 59
End: 2024-10-08
Payer: COMMERCIAL

## 2024-10-08 VITALS
DIASTOLIC BLOOD PRESSURE: 79 MMHG | HEART RATE: 94 BPM | HEIGHT: 74 IN | RESPIRATION RATE: 16 BRPM | SYSTOLIC BLOOD PRESSURE: 123 MMHG | TEMPERATURE: 97.3 F | OXYGEN SATURATION: 95 % | WEIGHT: 235 LBS | BODY MASS INDEX: 30.16 KG/M2

## 2024-10-08 DIAGNOSIS — K64.8 BLEEDING INTERNAL HEMORRHOIDS: Primary | ICD-10-CM

## 2024-10-08 PROCEDURE — 3008F BODY MASS INDEX DOCD: CPT | Performed by: SURGERY

## 2024-10-08 PROCEDURE — 46221 LIGATION OF HEMORRHOID(S): CPT | Performed by: SURGERY

## 2024-10-08 PROCEDURE — 1036F TOBACCO NON-USER: CPT | Performed by: SURGERY

## 2024-10-08 PROCEDURE — 99212 OFFICE O/P EST SF 10 MIN: CPT | Performed by: SURGERY

## 2024-10-08 NOTE — PROGRESS NOTES
History Of Present Illness  HPI grade internal hemorrhoids.  Since his last visit he still has had intermittent blood.  About change.  When he started the hemorrhoidal suppositories he developed diarrhea.  Improved after discontinuing.     Past Medical History  He has a past medical history of Other specified disorders of nose and nasal sinuses (10/19/2022), Other specified disorders of nose and nasal sinuses (10/19/2022), and Personal history of other specified conditions (10/19/2022).    Surgical History  He has no past surgical history on file.     Social History  He reports that he has never smoked. He has never used smokeless tobacco. He reports that he does not drink alcohol and does not use drugs.    Family History  Family History   Problem Relation Name Age of Onset    Heart disease Father          Allergies  Penicillins    Review of Systems 10 review of systems otherwise negative     Visit Vitals  /79   Pulse 94   Temp 36.3 °C (97.3 °F) (Temporal)   Resp 16        Physical Exam GENERAL  MENTAL STATUS - Alert. GENERAL APPEARANCE - Cooperative and well groomed. ORIENTATION - Oriented X4. BUILD & NUTRITION - Well nourished and well developed. HYDRATION - Well hydrated.    INTEGUMENTARY  GENERAL CHARACTERISTICS - Overall examination of the patient's skin reveals no rashes. COLOR - not icteric. SKIN MOISTURE - normal skin moisture. TEMPERATURE - normal worth is noted.    HEAD AND NECK  HEAD  HEAD SHAPE - Atraumatic and normocephalic.  TRACHEA - midline.  THYROID  GLAND CHARACTERISTICS - normal size and consistency and no palpable nodules.    EYE  SCLERA/CONJUNCTIVA - BILATERAL - Anicteric.    CHEST AND LUNG EXAM  AUSCULTATION -  BREATH SOUNDS - Clear.    BREAST - Did not examine.    CARDIOVASCULAR  AUSCULTATION: RHYTHM - Regular. HEART s.    ABDOMEN  PALPATION/PERCUSSION - Palpation and percussion of the abdomen reveal soft, non tender, no mass and no hepatosplenomegaly.  Skin tags noted.  No fissure no  "fistula.  Anoscopy reveals grade 2 large internal hemorrhoids at 7:00.  Rubber band ligation performed above the dentate line without complication..  Post pain syndrome.  LYMPHATIC  GENERAL LYMPHATICS  BREAST LYMPHATIC EXAM - No cervical adenopathy, supraclavicular adenopathy or axilliary adenopathy.         Last Recorded Vitals  Blood pressure 123/79, pulse 94, temperature 36.3 °C (97.3 °F), temperature source Temporal, resp. rate 16, height 1.88 m (6' 2\"), weight 107 kg (235 lb), SpO2 95%.    Relevant Results      No results found.      @[email or web address]@    Assessment/Plan       Satisfactory early ligation of       I spent 15 minutes in the professional and overall care of this patient.      Roney Mariscal MD  "

## 2024-10-08 NOTE — PATIENT INSTRUCTIONS
[facility].  Internal hemorrhoids were banded in the office today.  Please continue stool softeners avoid straining and increase fluids.  Follow-up office 2 weeks.  He may experience some bleeding that has stopped rubber band falls off.  Event of severe bleeding apply pressure arrest.  If this fails to relieve the bleeding please seek attention in the emergency room.  The small amount of bleeding is  expected.

## 2024-10-18 ENCOUNTER — TELEPHONE (OUTPATIENT)
Dept: GASTROENTEROLOGY | Facility: CLINIC | Age: 59
End: 2024-10-18
Payer: COMMERCIAL

## 2024-10-18 DIAGNOSIS — Z12.11 COLON CANCER SCREENING: Primary | ICD-10-CM

## 2024-10-18 RX ORDER — SODIUM, POTASSIUM,MAG SULFATES 17.5-3.13G
SOLUTION, RECONSTITUTED, ORAL ORAL
Qty: 354 ML | Refills: 0 | Status: SHIPPED | OUTPATIENT
Start: 2024-10-18

## 2024-10-22 ENCOUNTER — APPOINTMENT (OUTPATIENT)
Dept: SURGERY | Facility: CLINIC | Age: 59
End: 2024-10-22
Payer: COMMERCIAL

## 2024-10-22 VITALS
HEART RATE: 84 BPM | HEIGHT: 74 IN | OXYGEN SATURATION: 95 % | BODY MASS INDEX: 30.29 KG/M2 | SYSTOLIC BLOOD PRESSURE: 122 MMHG | WEIGHT: 236 LBS | TEMPERATURE: 98.3 F | DIASTOLIC BLOOD PRESSURE: 70 MMHG | RESPIRATION RATE: 16 BRPM

## 2024-10-22 DIAGNOSIS — K64.8 BLEEDING INTERNAL HEMORRHOIDS: Primary | ICD-10-CM

## 2024-10-22 PROCEDURE — 46221 LIGATION OF HEMORRHOID(S): CPT | Performed by: SURGERY

## 2024-10-22 PROCEDURE — 3008F BODY MASS INDEX DOCD: CPT | Performed by: SURGERY

## 2024-10-22 NOTE — PROGRESS NOTES
"History Of Present Illness  HPI follow-up rubber band ligation bleeding internal hemorrhoids 3 weeks ago.  After the last visit he did have an episode of clot on toilet paper approximately 3 to 5 days after rubber band ligation.  He has had no bleeding since that time.  Denies any change in bowel habits     Past Medical History  He has a past medical history of Other specified disorders of nose and nasal sinuses (10/19/2022), Other specified disorders of nose and nasal sinuses (10/19/2022), and Personal history of other specified conditions (10/19/2022).    Surgical History  He has no past surgical history on file.     Social History  He reports that he has never smoked. He has never used smokeless tobacco. He reports that he does not drink alcohol and does not use drugs.    Family History  Family History   Problem Relation Name Age of Onset    Heart disease Father          Allergies  Penicillins    Review of Systems 10 review of systems otherwise negative     Visit Vitals  /70   Pulse 84   Temp 36.8 °C (98.3 °F) (Temporal)   Resp 16        Physical Exam sclerae anicteric  Neck supple  Respiratory effort symmetric without accessory muscle use  Abdomen protuberant soft  Extremities without edema  Rectal exam, skin tags noted.  No fissure no fistula.  Anoscopy reveals grade 2 bleeding internal hemorrhoid at 5:00.  No mass no abscess.  Eschar noted at site of previous ligation at 7:00 rubber band ligation performed above the dentate line without complication.      Last Recorded Vitals  Blood pressure 122/70, pulse 84, temperature 36.8 °C (98.3 °F), temperature source Temporal, resp. rate 16, height 1.88 m (6' 2\"), weight 107 kg (236 lb), SpO2 95%.    Relevant Results      No results found.      @[email or web address]@    Assessment/Plan       Satisfactory rubber band ligation of what appears to be less remaining symptomatic internal hemorrhoid.  Follow-up 3 weeks     15  I spent 15 minutes in the professional and overall " care of this patient.      Roney Mariscal MD

## 2024-10-22 NOTE — PATIENT INSTRUCTIONS
Thank you for choosing [facility].  Rubber band ligation of internal hemorrhoid was again performed in the office today.  Please continue stool softeners avoid straining increase fluids.  Sitz bath's for comfort.  Follow-up office 3 weeks.  Contact the office for any concerns including but not limited to any bleeding or pain

## 2024-11-12 ENCOUNTER — APPOINTMENT (OUTPATIENT)
Dept: SURGERY | Facility: CLINIC | Age: 59
End: 2024-11-12
Payer: COMMERCIAL

## 2024-11-12 VITALS
WEIGHT: 237.6 LBS | BODY MASS INDEX: 30.49 KG/M2 | OXYGEN SATURATION: 94 % | SYSTOLIC BLOOD PRESSURE: 132 MMHG | HEIGHT: 74 IN | DIASTOLIC BLOOD PRESSURE: 84 MMHG | RESPIRATION RATE: 16 BRPM | TEMPERATURE: 97.9 F | HEART RATE: 84 BPM

## 2024-11-12 DIAGNOSIS — K64.8 BLEEDING INTERNAL HEMORRHOIDS: Primary | ICD-10-CM

## 2024-11-12 DIAGNOSIS — K60.2 ANAL FISSURE: ICD-10-CM

## 2024-11-12 PROCEDURE — 1036F TOBACCO NON-USER: CPT | Performed by: SURGERY

## 2024-11-12 PROCEDURE — 3008F BODY MASS INDEX DOCD: CPT | Performed by: SURGERY

## 2024-11-12 PROCEDURE — 99212 OFFICE O/P EST SF 10 MIN: CPT | Performed by: SURGERY

## 2024-11-12 PROCEDURE — 46221 LIGATION OF HEMORRHOID(S): CPT | Performed by: SURGERY

## 2024-11-12 NOTE — PATIENT INSTRUCTIONS
Thank you for choosing [facility].  Today's exam reveals a very small anal fissure which is most likely the source of your pain around the rectum.  Rubber band ligation also was recommended by internal hemorrhoid.  A prescription through mail order pharmacy for diltiazem cream has been sent to the pharmacy.  Please apply the cream to the affected area as indicated twice daily.  Follow-up in the office in 3 weeks time

## 2024-11-12 NOTE — PROGRESS NOTES
History Of Present Illness  HPI 3 weeks follow-up doing rubber band ligation bleeding internal hemorrhoids he complains more of stabbing pain around the rectum.  Rare blood on toilet paper none over the last recent interval.     Past Medical History  He has a past medical history of Other specified disorders of nose and nasal sinuses (10/19/2022), Other specified disorders of nose and nasal sinuses (10/19/2022), and Personal history of other specified conditions (10/19/2022).    Surgical History  He has no past surgical history on file.     Social History  He reports that he has never smoked. He has never used smokeless tobacco. He reports that he does not drink alcohol and does not use drugs.    Family History  Family History   Problem Relation Name Age of Onset    Heart disease Father          Allergies  Penicillins    Review of Systems  Systems otherwise  There were no vitals taken for this visit.     Physical Exam  GENERAL  MENTAL STATUS - Alert. GENERAL APPEARANCE - Cooperative and well groomed. ORIENTATION - Oriented X4. BUILD & NUTRITION - Well nourished and well developed. HYDRATION - Well hydrated.    INTEGUMENTARY  GENERAL CHARACTERISTICS - Overall examination of the patient's skin reveals no rashes. COLOR - not icteric. SKIN MOISTURE - normal skin moisture. TEMPERATURE - normal worth is noted.    HEAD AND NECK  HEAD  HEAD SHAPE - Atraumatic and normocephalic.  TRACHEA - midline.  THYROID  GLAND CHARACTERISTICS - normal size and consistency and no palpable nodules.    EYE  SCLERA/CONJUNCTIVA - BILATERAL - Anicteric.    CHEST AND LUNG EXAM respiratory effort symmetric without accessory muscle use              ABDOMEN  PALPATION/PERCUSSION - Palpation and percussion of the abdomen reveal soft, non tender, no mass and no hepatosplenomegaly.  External skin tags noted.  At the  6 o'clock position there is evidence of a small fissure.  Sphincter tone tight.  Rubber band ligation of hemorrhoidal bundle at the 3  o'clock position performed without complication.  Scant remaining hemorrhoidal tissue noted.  No abscess no mass no fistula  LYMPHATIC  GENERAL LYMPHATICS  BREAST LYMPHATIC EXAM - No cervical adenopathy, supraclavicular adenopathy or axilliary adenopathy.       Last Recorded Vitals  There were no vitals taken for this visit.    Relevant Results      No results found.      @[email or web address]@    Assessment/Plan       Patient's creatinine slightly is on the basis of this issue.  Will prescribe diltiazem cream.  Stool softeners avoid straining follow-up 3 weeks       I spent 15 minutes in the professional and overall care of this patient.      Roney Mariscal MD

## 2024-11-25 ENCOUNTER — TELEPHONE (OUTPATIENT)
Dept: GASTROENTEROLOGY | Facility: CLINIC | Age: 59
End: 2024-11-25
Payer: COMMERCIAL

## 2024-11-25 NOTE — TELEPHONE ENCOUNTER
Call to patient and LM letting him know we moved his appointment on 01/20/25 colonoscopy in [place] at 0930 to 02/21/25 0930 in [place] due to the dr not being in office on the 20th of January.

## 2024-12-03 ENCOUNTER — APPOINTMENT (OUTPATIENT)
Dept: SURGERY | Facility: CLINIC | Age: 59
End: 2024-12-03
Payer: COMMERCIAL

## 2024-12-03 VITALS
OXYGEN SATURATION: 96 % | WEIGHT: 236.6 LBS | HEART RATE: 92 BPM | DIASTOLIC BLOOD PRESSURE: 78 MMHG | RESPIRATION RATE: 17 BRPM | SYSTOLIC BLOOD PRESSURE: 130 MMHG | BODY MASS INDEX: 30.36 KG/M2 | TEMPERATURE: 95.9 F | HEIGHT: 74 IN

## 2024-12-03 DIAGNOSIS — K64.8 BLEEDING INTERNAL HEMORRHOIDS: Primary | ICD-10-CM

## 2024-12-03 DIAGNOSIS — K60.2 ANAL FISSURE: ICD-10-CM

## 2024-12-03 PROCEDURE — 3008F BODY MASS INDEX DOCD: CPT | Performed by: SURGERY

## 2024-12-03 PROCEDURE — 1036F TOBACCO NON-USER: CPT | Performed by: SURGERY

## 2024-12-03 PROCEDURE — 46221 LIGATION OF HEMORRHOID(S): CPT | Performed by: SURGERY

## 2024-12-03 PROCEDURE — 99212 OFFICE O/P EST SF 10 MIN: CPT | Performed by: SURGERY

## 2024-12-03 RX ORDER — CLOMIPRAMINE HYDROCHLORIDE 25 MG/1
CAPSULE ORAL
COMMUNITY
Start: 2024-10-28

## 2024-12-03 RX ORDER — FLUVOXAMINE MALEATE 100 MG/1
100 TABLET, COATED ORAL DAILY
COMMUNITY
Start: 2024-10-13

## 2024-12-03 RX ORDER — TEMAZEPAM 15 MG/1
CAPSULE ORAL
COMMUNITY
Start: 2024-10-31

## 2024-12-03 RX ORDER — ESTAZOLAM 1 MG/1
TABLET ORAL
COMMUNITY
Start: 2024-11-22

## 2024-12-03 RX ORDER — CLOMIPRAMINE HYDROCHLORIDE 50 MG/1
50 CAPSULE ORAL NIGHTLY
COMMUNITY
Start: 2024-11-25

## 2024-12-03 ASSESSMENT — PAIN SCALES - GENERAL: PAINLEVEL_OUTOF10: 0-NO PAIN

## 2024-12-03 NOTE — PROGRESS NOTES
History Of Present Illness  HPI patient fissure bleeding internal hemorrhoids.  Since the last visit the patient had scant bleeding after removing the sheath.  He only complains of pain over the rectal region when he has bowel movements or afterwards.  Pain sharp but otherwise tolerable.  He was unable to get the prescription for the diltiazem cream as it was listed as $120.     Past Medical History  He has a past medical history of Other specified disorders of nose and nasal sinuses (10/19/2022), Other specified disorders of nose and nasal sinuses (10/19/2022), and Personal history of other specified conditions (10/19/2022).    Surgical History  He has no past surgical history on file.     Social History  He reports that he has never smoked. He has never used smokeless tobacco. He reports that he does not drink alcohol and does not use drugs.    Family History  Family History   Problem Relation Name Age of Onset    Heart disease Father          Allergies  Penicillins    Review of Systems systems otherwise negative     Visit Vitals  /78 (BP Location: Right arm, Patient Position: Sitting, BP Cuff Size: Adult)   Pulse 92   Temp 35.5 °C (95.9 °F) (Temporal)   Resp 17        Physical Exam GENERAL  MENTAL STATUS - Alert. GENERAL APPEARANCE - Cooperative and well groomed. ORIENTATION - Oriented X4. BUILD & NUTRITION - Well nourished and well developed. HYDRATION - Well hydrated.    INTEGUMENTARY  GENERAL CHARACTERISTICS - Overall examination of the patient's skin reveals no rashes. COLOR - not icteric. SKIN MOISTURE - normal skin moisture. TEMPERATURE - normal worth is noted.    HEAD AND NECK  HEAD  HEAD SHAPE - Atraumatic and normocephalic.  TRACHEA - midline.  THYROID  GLAND CHARACTERISTICS - normal size and consistency and no palpable nodules.    EYE  SCLERA/CONJUNCTIVA - BILATERAL - Anicteric.    CHEST AND LUNG EXAM  AUSCULTATION -  BREATH SOUNDS - Clear.    BREAST - Did not  "examine.    CARDIOVASCULAR  AUSCULTATION: RHYTHM - Regular. HEART SOUNDS - Normal heart sounds.  MURMURS & OTHER HEART SOUNDS - Auscultation of the heart reveals regular rate and no murmurs.    ABDOMEN  PALPATION/PERCUSSION - Palpation and percussion of the abdomen reveal soft, non tender, no mass and no hepatosplenomegaly.  Skin tags noted.  Small fissure at 6:00.  Anoscopy reveals remaining hemorrhoidal bundle at 3:00 region.  No mass.  No abscess.  Sphincter tone normal.  Rubber band ligation performed without complication  LYMPHATIC  GENERAL LYMPHATICS  BREAST LYMPHATIC EXAM - No cervical adenopathy, supraclavicular adenopathy or axilliary adenopathy.         Last Recorded Vitals  Blood pressure 130/78, pulse 92, temperature 35.5 °C (95.9 °F), temperature source Temporal, resp. rate 17, height 1.88 m (6' 2\"), weight 107 kg (236 lb 9.6 oz), SpO2 96%.    Relevant Results      No results found.      @[email or web address]@    Assessment/Plan       Suspect Prenaissance appropriate for age.  Will reevaluate first of the year.  Prescribed nitroglycerin cream advised this could cause a headache or potential for drop in blood pressure.         I spent 15 minutes in the professional and overall care of this patient.      Roney Mariscal MD  "

## 2024-12-10 ENCOUNTER — TELEPHONE (OUTPATIENT)
Dept: SURGERY | Facility: CLINIC | Age: 59
End: 2024-12-10
Payer: COMMERCIAL

## 2024-12-10 NOTE — TELEPHONE ENCOUNTER
Pt called clinic he has questions regarding medication that was supposed to be sent to his pharmacy but never was, he says it was an ointment pls call him back    Thank you

## 2024-12-12 NOTE — TELEPHONE ENCOUNTER
Spoke with patient to notify him request for 12/3/2024 was never faxed to [facility] pharmacy. Notified pt that Dr. Mariscal is on vacation and will return Monday to sign and fax form over. Patient verbally understood

## 2024-12-19 DIAGNOSIS — K60.2 ANAL FISSURE: Primary | ICD-10-CM

## 2024-12-19 RX ORDER — NITROGLYCERIN 20 MG/G
0.5 OINTMENT TOPICAL
Qty: 30 G | Refills: 0 | Status: SHIPPED | OUTPATIENT
Start: 2024-12-19 | End: 2025-12-19

## 2025-01-07 ENCOUNTER — APPOINTMENT (OUTPATIENT)
Dept: SURGERY | Facility: CLINIC | Age: 60
End: 2025-01-07
Payer: COMMERCIAL

## 2025-01-24 ENCOUNTER — LAB (OUTPATIENT)
Dept: LAB | Facility: LAB | Age: 60
End: 2025-01-24
Payer: COMMERCIAL

## 2025-01-24 DIAGNOSIS — E11.29 TYPE 2 DIABETES MELLITUS WITH OTHER DIABETIC KIDNEY COMPLICATION: ICD-10-CM

## 2025-01-24 DIAGNOSIS — R39.89 OTHER SYMPTOMS AND SIGNS INVOLVING THE GENITOURINARY SYSTEM: ICD-10-CM

## 2025-01-24 DIAGNOSIS — E53.8 DEFICIENCY OF OTHER SPECIFIED B GROUP VITAMINS: Primary | ICD-10-CM

## 2025-01-24 DIAGNOSIS — I10 ESSENTIAL (PRIMARY) HYPERTENSION: ICD-10-CM

## 2025-01-24 DIAGNOSIS — E78.5 HYPERLIPIDEMIA, UNSPECIFIED: ICD-10-CM

## 2025-01-24 LAB
ALBUMIN SERPL BCP-MCNC: 4.7 G/DL (ref 3.4–5)
ALP SERPL-CCNC: 52 U/L (ref 33–136)
ALT SERPL W P-5'-P-CCNC: 28 U/L (ref 10–52)
AMORPH CRY #/AREA UR COMP ASSIST: ABNORMAL /HPF
ANION GAP SERPL CALC-SCNC: 13 MMOL/L (ref 10–20)
APPEARANCE UR: ABNORMAL
AST SERPL W P-5'-P-CCNC: 29 U/L (ref 9–39)
BASOPHILS # BLD AUTO: 0.04 X10*3/UL (ref 0–0.1)
BASOPHILS NFR BLD AUTO: 0.6 %
BILIRUB SERPL-MCNC: 1.1 MG/DL (ref 0–1.2)
BILIRUB UR STRIP.AUTO-MCNC: NEGATIVE MG/DL
BUN SERPL-MCNC: 15 MG/DL (ref 6–23)
CALCIUM SERPL-MCNC: 9.6 MG/DL (ref 8.6–10.3)
CHLORIDE SERPL-SCNC: 105 MMOL/L (ref 98–107)
CHOLEST SERPL-MCNC: 143 MG/DL (ref 0–199)
CHOLESTEROL/HDL RATIO: 4.6
CO2 SERPL-SCNC: 27 MMOL/L (ref 21–32)
COLOR UR: ABNORMAL
CREAT SERPL-MCNC: 1.26 MG/DL (ref 0.5–1.3)
CREAT UR-MCNC: 274.8 MG/DL (ref 20–370)
EGFRCR SERPLBLD CKD-EPI 2021: 65 ML/MIN/1.73M*2
EOSINOPHIL # BLD AUTO: 0.24 X10*3/UL (ref 0–0.7)
EOSINOPHIL NFR BLD AUTO: 3.4 %
ERYTHROCYTE [DISTWIDTH] IN BLOOD BY AUTOMATED COUNT: 12.5 % (ref 11.5–14.5)
GLUCOSE SERPL-MCNC: 150 MG/DL (ref 74–99)
GLUCOSE UR STRIP.AUTO-MCNC: ABNORMAL MG/DL
HCT VFR BLD AUTO: 48.1 % (ref 41–52)
HDLC SERPL-MCNC: 31.2 MG/DL
HGB BLD-MCNC: 15.8 G/DL (ref 13.5–17.5)
IMM GRANULOCYTES # BLD AUTO: 0.01 X10*3/UL (ref 0–0.7)
IMM GRANULOCYTES NFR BLD AUTO: 0.1 % (ref 0–0.9)
KETONES UR STRIP.AUTO-MCNC: NEGATIVE MG/DL
LDLC SERPL CALC-MCNC: 90 MG/DL
LEUKOCYTE ESTERASE UR QL STRIP.AUTO: NEGATIVE
LYMPHOCYTES # BLD AUTO: 2.2 X10*3/UL (ref 1.2–4.8)
LYMPHOCYTES NFR BLD AUTO: 30.8 %
MCH RBC QN AUTO: 28.8 PG (ref 26–34)
MCHC RBC AUTO-ENTMCNC: 32.8 G/DL (ref 32–36)
MCV RBC AUTO: 88 FL (ref 80–100)
MICROALBUMIN UR-MCNC: 7.6 MG/L
MICROALBUMIN/CREAT UR: 2.8 UG/MG CREAT
MONOCYTES # BLD AUTO: 0.61 X10*3/UL (ref 0.1–1)
MONOCYTES NFR BLD AUTO: 8.5 %
MUCOUS THREADS #/AREA URNS AUTO: ABNORMAL /LPF
NEUTROPHILS # BLD AUTO: 4.04 X10*3/UL (ref 1.2–7.7)
NEUTROPHILS NFR BLD AUTO: 56.6 %
NITRITE UR QL STRIP.AUTO: NEGATIVE
NON HDL CHOLESTEROL: 112 MG/DL (ref 0–149)
NRBC BLD-RTO: 0 /100 WBCS (ref 0–0)
PH UR STRIP.AUTO: 5 [PH]
PLATELET # BLD AUTO: 243 X10*3/UL (ref 150–450)
POTASSIUM SERPL-SCNC: 4.6 MMOL/L (ref 3.5–5.3)
PROT SERPL-MCNC: 6.9 G/DL (ref 6.4–8.2)
PROT UR STRIP.AUTO-MCNC: ABNORMAL MG/DL
RBC # BLD AUTO: 5.48 X10*6/UL (ref 4.5–5.9)
RBC # UR STRIP.AUTO: NEGATIVE /UL
RBC #/AREA URNS AUTO: ABNORMAL /HPF
SODIUM SERPL-SCNC: 140 MMOL/L (ref 136–145)
SP GR UR STRIP.AUTO: 1.03
TRIGL SERPL-MCNC: 107 MG/DL (ref 0–149)
UROBILINOGEN UR STRIP.AUTO-MCNC: NORMAL MG/DL
VIT B12 SERPL-MCNC: 422 PG/ML (ref 211–911)
VLDL: 21 MG/DL (ref 0–40)
WBC # BLD AUTO: 7.1 X10*3/UL (ref 4.4–11.3)
WBC #/AREA URNS AUTO: ABNORMAL /HPF

## 2025-01-24 PROCEDURE — 81001 URINALYSIS AUTO W/SCOPE: CPT

## 2025-01-24 PROCEDURE — 80061 LIPID PANEL: CPT

## 2025-01-24 PROCEDURE — 82570 ASSAY OF URINE CREATININE: CPT

## 2025-01-24 PROCEDURE — 85025 COMPLETE CBC W/AUTO DIFF WBC: CPT

## 2025-01-24 PROCEDURE — 80053 COMPREHEN METABOLIC PANEL: CPT

## 2025-01-24 PROCEDURE — 82043 UR ALBUMIN QUANTITATIVE: CPT

## 2025-01-24 PROCEDURE — 82607 VITAMIN B-12: CPT

## 2025-02-19 LAB
APPEARANCE UR: ABNORMAL
BACTERIA #/AREA URNS HPF: ABNORMAL /HPF
BILIRUB UR QL STRIP: NEGATIVE
COLOR UR: ABNORMAL
GLUCOSE UR QL STRIP: ABNORMAL
HGB UR QL STRIP: NEGATIVE
HYALINE CASTS #/AREA URNS LPF: ABNORMAL /LPF
KETONES UR QL STRIP: ABNORMAL
LEUKOCYTE ESTERASE UR QL STRIP: NEGATIVE
NITRITE UR QL STRIP: NEGATIVE
PH UR STRIP: ABNORMAL [PH] (ref 5–8)
PROT UR QL STRIP: ABNORMAL
PSA SERPL-MCNC: 7.02 NG/ML
RBC #/AREA URNS HPF: ABNORMAL /HPF
SERVICE CMNT-IMP: ABNORMAL
SP GR UR STRIP: 1.03 (ref 1–1.03)
SQUAMOUS #/AREA URNS HPF: ABNORMAL /HPF
WBC #/AREA URNS HPF: ABNORMAL /HPF

## 2025-02-21 ENCOUNTER — HOSPITAL ENCOUNTER (OUTPATIENT)
Dept: GASTROENTEROLOGY | Facility: HOSPITAL | Age: 60
Discharge: HOME | End: 2025-02-21
Payer: COMMERCIAL

## 2025-02-21 ENCOUNTER — HOSPITAL ENCOUNTER (OUTPATIENT)
Dept: GASTROENTEROLOGY | Facility: HOSPITAL | Age: 60
End: 2025-02-21
Payer: COMMERCIAL

## 2025-02-21 ENCOUNTER — ANESTHESIA EVENT (OUTPATIENT)
Dept: GASTROENTEROLOGY | Facility: HOSPITAL | Age: 60
End: 2025-02-21
Payer: COMMERCIAL

## 2025-02-21 ENCOUNTER — ANESTHESIA (OUTPATIENT)
Dept: GASTROENTEROLOGY | Facility: HOSPITAL | Age: 60
End: 2025-02-21
Payer: COMMERCIAL

## 2025-02-21 VITALS
HEART RATE: 85 BPM | BODY MASS INDEX: 30.27 KG/M2 | OXYGEN SATURATION: 96 % | DIASTOLIC BLOOD PRESSURE: 71 MMHG | SYSTOLIC BLOOD PRESSURE: 124 MMHG | RESPIRATION RATE: 20 BRPM | TEMPERATURE: 98.4 F | HEIGHT: 74 IN | WEIGHT: 235.89 LBS

## 2025-02-21 DIAGNOSIS — Z12.11 SCREEN FOR COLON CANCER: ICD-10-CM

## 2025-02-21 DIAGNOSIS — Z12.11 ENCOUNTER FOR SCREENING FOR MALIGNANT NEOPLASM OF COLON: ICD-10-CM

## 2025-02-21 DIAGNOSIS — Z12.11 COLON CANCER SCREENING: ICD-10-CM

## 2025-02-21 PROCEDURE — 7100000009 HC PHASE TWO TIME - INITIAL BASE CHARGE

## 2025-02-21 PROCEDURE — 3700000002 HC GENERAL ANESTHESIA TIME - EACH INCREMENTAL 1 MINUTE

## 2025-02-21 PROCEDURE — 45385 COLONOSCOPY W/LESION REMOVAL: CPT | Performed by: INTERNAL MEDICINE

## 2025-02-21 PROCEDURE — 2500000004 HC RX 250 GENERAL PHARMACY W/ HCPCS (ALT 636 FOR OP/ED): Performed by: NURSE ANESTHETIST, CERTIFIED REGISTERED

## 2025-02-21 PROCEDURE — 7100000010 HC PHASE TWO TIME - EACH INCREMENTAL 1 MINUTE

## 2025-02-21 PROCEDURE — 3700000001 HC GENERAL ANESTHESIA TIME - INITIAL BASE CHARGE

## 2025-02-21 RX ORDER — PROPOFOL 10 MG/ML
INJECTION, EMULSION INTRAVENOUS AS NEEDED
Status: DISCONTINUED | OUTPATIENT
Start: 2025-02-21 | End: 2025-02-21

## 2025-02-21 RX ORDER — LIDOCAINE HCL/PF 100 MG/5ML
SYRINGE (ML) INTRAVENOUS AS NEEDED
Status: DISCONTINUED | OUTPATIENT
Start: 2025-02-21 | End: 2025-02-21

## 2025-02-21 RX ADMIN — PROPOFOL 100 MCG/KG/MIN: 10 INJECTION, EMULSION INTRAVENOUS at 09:53

## 2025-02-21 RX ADMIN — LIDOCAINE HYDROCHLORIDE 80 MG: 20 INJECTION, SOLUTION INTRAVENOUS at 09:52

## 2025-02-21 RX ADMIN — PROPOFOL 30 MG: 10 INJECTION, EMULSION INTRAVENOUS at 10:05

## 2025-02-21 RX ADMIN — PROPOFOL 30 MG: 10 INJECTION, EMULSION INTRAVENOUS at 10:01

## 2025-02-21 RX ADMIN — PROPOFOL 70 MG: 10 INJECTION, EMULSION INTRAVENOUS at 09:52

## 2025-02-21 SDOH — HEALTH STABILITY: MENTAL HEALTH: CURRENT SMOKER: 0

## 2025-02-21 ASSESSMENT — PAIN SCALES - GENERAL
PAINLEVEL_OUTOF10: 0 - NO PAIN

## 2025-02-21 ASSESSMENT — PAIN - FUNCTIONAL ASSESSMENT: PAIN_FUNCTIONAL_ASSESSMENT: 0-10

## 2025-02-21 NOTE — ANESTHESIA POSTPROCEDURE EVALUATION
Patient: Dayday Ross    Procedure Summary       Date: 02/21/25 Room / Location: [facility]    Anesthesia Start: 0949 Anesthesia Stop:     Procedure: COLONOSCOPY Diagnosis: Screen for colon cancer    Scheduled Providers: Alexis Mccullough MD Responsible Provider: Allen Douglass MD    Anesthesia Type: MAC ASA Status: 2            Anesthesia Type: MAC    Vitals Value Taken Time   /65 02/21/25 1018   Temp 36.9 02/21/25 1018   Pulse 91 02/21/25 1018   Resp 20 02/21/25 1018   SpO2 95% 02/21/25 1018       Anesthesia Post Evaluation    Patient location during evaluation: PACU  Patient participation: complete - patient participated  Level of consciousness: sleepy but conscious  Pain management: adequate  Airway patency: patent  Cardiovascular status: acceptable  Respiratory status: acceptable  Hydration status: acceptable  Postoperative Nausea and Vomiting: none        There were no known notable events for this encounter.

## 2025-02-21 NOTE — POST-PROCEDURE NOTE
Pt is tolerating PO snack well.  Reviewed discharge instructions, educated pt on anesthesia safety. Re enforced light activity-no heavy lifting/pushing/pulling-watch for pain & bleeding-importance of follow up colonoscopy screenings  All pre-op belongings with the pt.

## 2025-02-21 NOTE — ANESTHESIA PREPROCEDURE EVALUATION
Patient: Dayday Ross    Procedure Information       Date/Time: 02/21/25 0930    Scheduled providers: Alexis Mccullough MD    Procedure: COLONOSCOPY    Location: [facility]            Relevant Problems   GI   (+) Bleeding internal hemorrhoids       Clinical information reviewed:    Allergies  Meds               NPO Detail:  NPO/Void Status  Date of Last Liquid: 02/20/25  Time of Last Liquid: 2100  Date of Last Solid: 02/19/25  Time of Last Solid: 2300         Physical Exam    Airway  Mallampati: I  TM distance: >3 FB  Neck ROM: full     Cardiovascular - normal exam     Dental - normal exam     Pulmonary - normal exam     Abdominal - normal exam             Anesthesia Plan    History of general anesthesia?: yes  History of complications of general anesthesia?: no    ASA 2     MAC     The patient is not a current smoker.  Patient was previously instructed to abstain from smoking on day of procedure.  Patient did not smoke on day of procedure.    intravenous induction   Postoperative administration of opioids is intended.  Trial extubation is planned.  Anesthetic plan and risks discussed with patient.  Use of blood products discussed with patient who consented to blood products.    Plan discussed with CRNA.

## 2025-02-22 NOTE — PROGRESS NOTES
Subjective   Patient ID: Dayday Ross is a 59 y.o. male who presents for A F/U ON HIS H/O AN ELEVATED PSA.  NO FAMILY H/O PROSTATAE CANCER  HPI:  Are you experiencing:  Burning on urination -- NO  Pain on urination  -- NO  Urinary frequency -- NO  Urinary urgency -- NO  Urge incontinence --NO  Urinary stress incontinence  -- NO  Number of pads used per day --NONE  Enuresis -- NO  Nocturia-- 3-4 X ON AVG  Hematuria --NO  Hesitancy -- NO  Post void fullness -- NO   Strength of your stream-- COMFORTABLE     ROS:  General-- No C/O fever or chills  Head-- No C/O Dizziness  Eyes-- NO  C/O blurry or double vision  Ears-- No C/O hearing loss  Neck-- Supple  Chest-- No C/O pain or discomfort  Lungs-- No C/O shortness of breath  Abdomen-- No C/O  pain or discomfort, No nausea or vomiting  Back-- No C/O back pain or discomfort  Extremities-- No C/O swelling or pain    OBJECTIVE  General-- well-developed, well-nourished in NAD  Head-- normal cephalic, atraumatic  Eyes-- PERRL, EOM'S FROM, no jaundice  Neck-- Supple, without masses  Chest-- Normal bony structure  Abdomen-- soft, non tender, liver spleen not palpable. No suprapubic masses.  Back-- no flank masses palpable, no CVA tenderness on palpation or percussion  Lymph nodes-- No inguinal lymphadenopathy noted  Prostate-- 2+, firm, smooth, non-tender, without nodules  Testis-- both down, non-tender, without masses  Epididymis-- no masses palpable  Scrotum -- no hydrocele noted  Extremities -- Normal muscle mass and tone for the patients age  Neurological-- oriented times three    PSA  2/18/2025--7.02  8-13-24-- 5.84  2/16/2024--6.2--Free PSA 19%  10-18-23-- 9.6-- FREE PSA-- 17%  9-15-23 -- 19.1 -- FREE PSA -- 19 %  3/24/2023--9.41  2/21/2023--4.6  11/18/2022--4.2     Assessment/Plan   A:  OLD RECORDS FROM [facility] UROLOGY  REVIEWED   H/O A VARIABLE PSA WITH A NORMAL FEELING PROSTATE ( COMING DOWN FORM A HIGH OF 19.1 )   NO  FAMILY H/O PROSTATE CANCER  PT S/P TRUSP BX   4-21-23- NEG FOR CANCER -- PROSTATE SIZE --47.6  OPTIONS OF FURTHER EVALUATION DISCUSSED IE:  1) CONTINUE CONSERVATIVE THERAPY FOR NOW AND F/U IN 6 MONTHS FOR A PALMA AND PSA RE CK  2) PROCEED WITH A MULTI PARA METRIC MRI OF THE PROSTATE  ALL QUESTIONS ANSWERED     H/O AODM  H/O A SLIGHTLY ELEVATED SERUM CREATININE IN THE PAST -- 1.35 ON 3-30-23. --1.26 ON 1-24-25  P:  REASSURANCE, EDUCATION, SUPPORTIVE CARE RENDERED TO THE PT  CONSERVATIVE THERAPY FOR NOW  F/U IN 6 MONTHS FOR A PALMA AND PSA CK  Piero Ramos MD 02/22/25 11:56 AM

## 2025-02-24 ENCOUNTER — OFFICE VISIT (OUTPATIENT)
Dept: UROLOGY | Facility: CLINIC | Age: 60
End: 2025-02-24
Payer: COMMERCIAL

## 2025-02-24 VITALS
DIASTOLIC BLOOD PRESSURE: 82 MMHG | SYSTOLIC BLOOD PRESSURE: 124 MMHG | RESPIRATION RATE: 16 BRPM | HEART RATE: 77 BPM | OXYGEN SATURATION: 98 % | TEMPERATURE: 98.4 F | WEIGHT: 230 LBS | BODY MASS INDEX: 29.52 KG/M2 | HEIGHT: 74 IN

## 2025-02-24 DIAGNOSIS — R39.9 URINARY SYMPTOM OR SIGN: ICD-10-CM

## 2025-02-24 DIAGNOSIS — R97.20 ELEVATED PSA: ICD-10-CM

## 2025-02-24 DIAGNOSIS — Z12.5 SCREENING FOR PROSTATE CANCER: ICD-10-CM

## 2025-02-24 DIAGNOSIS — R35.1 NOCTURIA: Primary | ICD-10-CM

## 2025-02-24 PROCEDURE — 3008F BODY MASS INDEX DOCD: CPT | Performed by: UROLOGY

## 2025-02-24 PROCEDURE — 1036F TOBACCO NON-USER: CPT | Performed by: UROLOGY

## 2025-02-24 PROCEDURE — 99214 OFFICE O/P EST MOD 30 MIN: CPT | Performed by: UROLOGY

## 2025-02-24 ASSESSMENT — ENCOUNTER SYMPTOMS
LOSS OF SENSATION IN FEET: 0
DEPRESSION: 1
OCCASIONAL FEELINGS OF UNSTEADINESS: 0

## 2025-02-24 ASSESSMENT — PAIN SCALES - GENERAL: PAINLEVEL_OUTOF10: 0-NO PAIN

## 2025-02-24 NOTE — LETTER
February 28, 2025     Yolette Escamilla MD  [address]  [place]  [facility] [address]    Patient: Dayday Ross   YOB: 1965   Date of Visit: 2/24/2025       Dear Dr. Yolette Escamilla MD:    Thank you for referring Dayday Ross to me for evaluation. Below are my notes for this consultation.  If you have questions, please do not hesitate to call me. I look forward to following your patient along with you.       Sincerely,     Piero Ramos MD      CC: No Recipients  ______________________________________________________________________________________    Subjective  Patient ID: Dayday Ross is a 59 y.o. male who presents for A F/U ON HIS H/O AN ELEVATED PSA.  NO FAMILY H/O PROSTATAE CANCER  HPI:  Are you experiencing:  Burning on urination -- NO  Pain on urination  -- NO  Urinary frequency -- NO  Urinary urgency -- NO  Urge incontinence --NO  Urinary stress incontinence  -- NO  Number of pads used per day --NONE  Enuresis -- NO  Nocturia-- 3-4 X ON AVG  Hematuria --NO  Hesitancy -- NO  Post void fullness -- NO   Strength of your stream-- COMFORTABLE     ROS:  General-- No C/O fever or chills  Head-- No C/O Dizziness  Eyes-- NO  C/O blurry or double vision  Ears-- No C/O hearing loss  Neck-- Supple  Chest-- No C/O pain or discomfort  Lungs-- No C/O shortness of breath  Abdomen-- No C/O  pain or discomfort, No nausea or vomiting  Back-- No C/O back pain or discomfort  Extremities-- No C/O swelling or pain    OBJECTIVE  General-- well-developed, well-nourished in NAD  Head-- normal cephalic, atraumatic  Eyes-- PERRL, EOM'S FROM, no jaundice  Neck-- Supple, without masses  Chest-- Normal bony structure  Abdomen-- soft, non tender, liver spleen not palpable. No suprapubic masses.  Back-- no flank masses palpable, no CVA tenderness on palpation or percussion  Lymph nodes-- No inguinal lymphadenopathy noted  Prostate-- 2+, firm, smooth, non-tender, without nodules  Testis-- both down, non-tender, without  masses  Epididymis-- no masses palpable  Scrotum -- no hydrocele noted  Extremities -- Normal muscle mass and tone for the patients age  Neurological-- oriented times three    PSA  2/18/2025--7.02  8-13-24-- 5.84  2/16/2024--6.2--Free PSA 19%  10-18-23-- 9.6-- FREE PSA-- 17%  9-15-23 -- 19.1 -- FREE PSA -- 19 %  3/24/2023--9.41  2/21/2023--4.6  11/18/2022--4.2     Assessment/Plan   A:  OLD RECORDS FROM [facility] UROLOGY  REVIEWED   H/O A VARIABLE PSA WITH A NORMAL FEELING PROSTATE ( COMING DOWN FORM A HIGH OF 19.1 )   NO  FAMILY H/O PROSTATE CANCER  PT S/P TRUSP BX  4-21-23- NEG FOR CANCER -- PROSTATE SIZE --47.6  OPTIONS OF FURTHER EVALUATION DISCUSSED IE:  1) CONTINUE CONSERVATIVE THERAPY FOR NOW AND F/U IN 6 MONTHS FOR A PALMA AND PSA RE CK  2) PROCEED WITH A MULTI PARA METRIC MRI OF THE PROSTATE  ALL QUESTIONS ANSWERED     H/O AODM  H/O A SLIGHTLY ELEVATED SERUM CREATININE IN THE PAST -- 1.35 ON 3-30-23. --1.26 ON 1-24-25  P:  REASSURANCE, EDUCATION, SUPPORTIVE CARE RENDERED TO THE PT  CONSERVATIVE THERAPY FOR NOW  F/U IN 6 MONTHS FOR A PALMA AND PSA CK  Piero Ramos MD 02/22/25 11:56 AM

## 2025-02-26 LAB — GLUCOSE BLD MANUAL STRIP-MCNC: 168 MG/DL (ref 74–99)

## 2025-02-27 LAB
LABORATORY COMMENT REPORT: NORMAL
PATH REPORT.FINAL DX SPEC: NORMAL
PATH REPORT.GROSS SPEC: NORMAL
PATH REPORT.RELEVANT HX SPEC: NORMAL
PATH REPORT.TOTAL CANCER: NORMAL
RESIDENT REVIEW: NORMAL

## 2025-03-04 ENCOUNTER — TELEPHONE (OUTPATIENT)
Dept: GASTROENTEROLOGY | Facility: CLINIC | Age: 60
End: 2025-03-04
Payer: COMMERCIAL

## 2025-03-04 NOTE — TELEPHONE ENCOUNTER
Patient of Dr. Mccullough's called on 03/04/2025 and has some questions regarding his previous Colonoscopy performed 02/21/2025.  He has some concerns that he would like to talk to Dr. Mccullough about. Thank you

## 2025-04-06 ASSESSMENT — ENCOUNTER SYMPTOMS
NEUROLOGICAL NEGATIVE: 1
ENDOCRINE NEGATIVE: 1
CARDIOVASCULAR NEGATIVE: 1
RESPIRATORY NEGATIVE: 1
CONSTITUTIONAL NEGATIVE: 1
MUSCULOSKELETAL NEGATIVE: 1
EYES NEGATIVE: 1
PSYCHIATRIC NEGATIVE: 1
ROS GI COMMENTS: AS IN HPI

## 2025-04-06 NOTE — PROGRESS NOTES
Subjective     History of Present Illness:   Dayday Ross is a 59 y.o. male who presents to GI clinic for follow-up. He had been seen in the past for internal hemorrhoids and treated conservatively and had a colonoscopy 4 years ago and was told he has hemorrhoids. I last saw Dayday on 9/17/2024, at which time it was determined that his symptoms of rectal pain were likely due to the hemorrhoids. The plan was to obtain a colonoscopy to further evaluate. I had also referred him to one of my surgical colleagues for consideration of banding of the hemorrhoids as patient was looking for a long term solution.     In the interim, patient underwent rubber band ligation of the hemorrhoids.  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     Today, he says his pain and bleeding is much better since his band ligation. Patient mentions that he takes 3 medications for his anxiety and also sees a psychiatrist and . He is very anxious regarding his biopsy results. He cannot take the suprep because it is a lot of liquid for him and causes him to wretch.    Review of Systems  Review of Systems   Constitutional: Negative.    HENT: Negative.     Eyes: Negative.    Respiratory: Negative.     Cardiovascular: Negative.    Gastrointestinal:         As in HPI    Endocrine: Negative.    Genitourinary: Negative.    Musculoskeletal: Negative.    Skin: Negative.    Neurological: Negative.    Psychiatric/Behavioral: Negative.         Past Medical History   has a past medical history of Other specified disorders of  "nose and nasal sinuses (10/19/2022), Other specified disorders of nose and nasal sinuses (10/19/2022), and Personal history of other specified conditions (10/19/2022).     Social History   reports that he has never smoked. He has never used smokeless tobacco. He reports that he does not drink alcohol and does not use drugs.     Family History  family history includes Heart disease in his father.     Allergies  Allergies   Allergen Reactions    Penicillins Swelling and Hives       Medications  Current Outpatient Medications   Medication Instructions    atenolol (Tenormin) 25 mg tablet Take by mouth.    busPIRone (BUSPAR) 15 mg, 3 times daily    cyanocobalamin (VITAMIN B-12) 1,000 mcg, Daily    hydrOXYzine HCL (Atarax) 50 mg tablet TAKE 1 TO 2 TABLETS BY MOUTH UP TO TWICE DAILY AS NEEDED FOR ANXIETY OR SLEEP    Januvia 50 mg, Daily    Quviviq 50 mg tablet 1 tablet, Nightly    rosuvastatin 10 mg, Daily    sertraline (ZOLOFT) 50 mg, Daily    Trulicity 0.75 mg/0.5 mL pen injector INJECT 0.75 MG SUBCUTANEOUSLY ONCE WEEKLY ON MONDAYS. ROTATE INJECTION SITES       Objective   /86 (BP Location: Right arm, Patient Position: Sitting)   Pulse 73   Ht 1.88 m (6' 2\")   Wt 106 kg (232 lb 9.6 oz)   BMI 29.86 kg/m²   Physical Exam  Vitals reviewed.   Constitutional:       Appearance: Normal appearance.   HENT:      Head: Normocephalic.   Eyes:      Conjunctiva/sclera: Conjunctivae normal.      Pupils: Pupils are equal, round, and reactive to light.   Cardiovascular:      Rate and Rhythm: Normal rate and regular rhythm.   Pulmonary:      Effort: Pulmonary effort is normal.      Breath sounds: Normal breath sounds.   Abdominal:      General: Abdomen is flat. Bowel sounds are normal. There is no distension.      Palpations: Abdomen is soft.      Tenderness: There is no abdominal tenderness. There is no guarding or rebound.   Musculoskeletal:         General: Normal range of motion.   Skin:     General: Skin is warm and dry. "   Neurological:      General: No focal deficit present.      Mental Status: He is alert and oriented to person, place, and time.       Assessment/Plan   Dayday Ross is a 59 y.o. male who presents to GI clinic for follow up of his colonoscopy  Wanted to discuss the results     #Hemorrhoids  Pain and bleeding improved after rubber band ligation. Continue monitoring.     #Biopsy showing focal dysplasia  He is a little anxious and concerned about his biopsy results showing focal dyplasia. I reassured him that it can sometimes progress to cancer. However, this will take many many years.   I told him that it is okay to wait 1 year before getting a colonoscopy.  Will give him Sutab next time.     He will return in 2/2026 for a colonoscop    By signing my name below, I, Madyson Blake attest that this documentation has been prepared under the direction and in the presence of Alexis Mccullough MD

## 2025-04-11 ENCOUNTER — APPOINTMENT (OUTPATIENT)
Dept: GASTROENTEROLOGY | Facility: CLINIC | Age: 60
End: 2025-04-11
Payer: COMMERCIAL

## 2025-04-11 VITALS
HEIGHT: 74 IN | DIASTOLIC BLOOD PRESSURE: 86 MMHG | HEART RATE: 73 BPM | WEIGHT: 232.6 LBS | SYSTOLIC BLOOD PRESSURE: 140 MMHG | BODY MASS INDEX: 29.85 KG/M2

## 2025-04-11 DIAGNOSIS — K62.5 RECTAL BLEEDING: Primary | ICD-10-CM

## 2025-04-11 DIAGNOSIS — D12.6 HIGH GRADE DYSPLASIA IN COLONIC ADENOMA: ICD-10-CM

## 2025-04-11 PROCEDURE — 99213 OFFICE O/P EST LOW 20 MIN: CPT | Performed by: INTERNAL MEDICINE

## 2025-04-11 PROCEDURE — 1036F TOBACCO NON-USER: CPT | Performed by: INTERNAL MEDICINE

## 2025-04-11 PROCEDURE — 3008F BODY MASS INDEX DOCD: CPT | Performed by: INTERNAL MEDICINE

## 2025-04-11 RX ORDER — SERTRALINE HYDROCHLORIDE 50 MG/1
50 TABLET, FILM COATED ORAL DAILY
COMMUNITY
Start: 2025-04-05

## 2025-04-11 RX ORDER — DULAGLUTIDE 0.75 MG/.5ML
INJECTION, SOLUTION SUBCUTANEOUS
COMMUNITY
Start: 2025-04-04

## 2025-08-15 LAB
APPEARANCE UR: CLEAR
BACTERIA #/AREA URNS HPF: ABNORMAL /HPF
BACTERIA UR CULT: ABNORMAL
BILIRUB UR QL STRIP: NEGATIVE
COLOR UR: YELLOW
GLUCOSE UR QL STRIP: ABNORMAL
HGB UR QL STRIP: NEGATIVE
HYALINE CASTS #/AREA URNS LPF: ABNORMAL /LPF
KETONES UR QL STRIP: NEGATIVE
LEUKOCYTE ESTERASE UR QL STRIP: NEGATIVE
NITRITE UR QL STRIP: NEGATIVE
PH UR STRIP: ABNORMAL [PH] (ref 5–8)
PROT UR QL STRIP: NEGATIVE
PSA SERPL-MCNC: 7.45 NG/ML
RBC #/AREA URNS HPF: ABNORMAL /HPF
SERVICE CMNT-IMP: ABNORMAL
SP GR UR STRIP: 1.03 (ref 1–1.03)
SQUAMOUS #/AREA URNS HPF: ABNORMAL /HPF
WBC #/AREA URNS HPF: ABNORMAL /HPF

## 2025-08-25 ENCOUNTER — APPOINTMENT (OUTPATIENT)
Age: 60
End: 2025-08-25
Payer: COMMERCIAL

## 2025-08-25 VITALS
HEART RATE: 76 BPM | HEIGHT: 74 IN | DIASTOLIC BLOOD PRESSURE: 90 MMHG | TEMPERATURE: 97.9 F | BODY MASS INDEX: 29.77 KG/M2 | SYSTOLIC BLOOD PRESSURE: 139 MMHG | WEIGHT: 232 LBS

## 2025-08-25 DIAGNOSIS — R97.20 ELEVATED PSA: ICD-10-CM

## 2025-08-25 DIAGNOSIS — R35.1 NOCTURIA: Primary | ICD-10-CM

## 2025-08-25 DIAGNOSIS — R39.9 URINARY SYMPTOM OR SIGN: ICD-10-CM

## 2025-08-25 DIAGNOSIS — Z12.5 SCREENING FOR PROSTATE CANCER: ICD-10-CM

## 2025-08-25 PROCEDURE — 3008F BODY MASS INDEX DOCD: CPT | Performed by: UROLOGY

## 2025-08-25 PROCEDURE — 1036F TOBACCO NON-USER: CPT | Performed by: UROLOGY

## 2025-08-25 PROCEDURE — 99214 OFFICE O/P EST MOD 30 MIN: CPT | Performed by: UROLOGY

## 2025-08-25 RX ORDER — LUMATEPERONE 21 MG/1
21 CAPSULE ORAL DAILY PRN
COMMUNITY
Start: 2025-07-20

## 2025-09-01 ASSESSMENT — ENCOUNTER SYMPTOMS
CONSTITUTIONAL NEGATIVE: 1
MUSCULOSKELETAL NEGATIVE: 1
RESPIRATORY NEGATIVE: 1
NEUROLOGICAL NEGATIVE: 1
ENDOCRINE NEGATIVE: 1
EYES NEGATIVE: 1
CARDIOVASCULAR NEGATIVE: 1
PSYCHIATRIC NEGATIVE: 1
ROS GI COMMENTS: AS IN HPI

## 2025-09-02 ENCOUNTER — APPOINTMENT (OUTPATIENT)
Dept: GASTROENTEROLOGY | Facility: CLINIC | Age: 60
End: 2025-09-02
Payer: COMMERCIAL

## 2026-02-25 ENCOUNTER — APPOINTMENT (OUTPATIENT)
Age: 61
End: 2026-02-25
Payer: COMMERCIAL